# Patient Record
Sex: MALE | Race: OTHER | HISPANIC OR LATINO
[De-identification: names, ages, dates, MRNs, and addresses within clinical notes are randomized per-mention and may not be internally consistent; named-entity substitution may affect disease eponyms.]

---

## 2017-03-23 ENCOUNTER — LABORATORY RESULT (OUTPATIENT)
Age: 32
End: 2017-03-23

## 2017-03-23 ENCOUNTER — APPOINTMENT (OUTPATIENT)
Dept: INTERNAL MEDICINE | Facility: CLINIC | Age: 32
End: 2017-03-23

## 2017-03-23 VITALS
DIASTOLIC BLOOD PRESSURE: 84 MMHG | BODY MASS INDEX: 37.33 KG/M2 | TEMPERATURE: 98.6 F | HEART RATE: 102 BPM | OXYGEN SATURATION: 97 % | WEIGHT: 252 LBS | HEIGHT: 69 IN | SYSTOLIC BLOOD PRESSURE: 114 MMHG

## 2017-03-23 DIAGNOSIS — E80.4 GILBERT SYNDROME: ICD-10-CM

## 2017-03-23 DIAGNOSIS — R89.9 UNSPECIFIED ABNORMAL FINDING IN SPECIMENS FROM OTHER ORGANS, SYSTEMS AND TISSUES: ICD-10-CM

## 2017-03-23 RX ORDER — TRAZODONE HYDROCHLORIDE 100 MG/1
100 TABLET ORAL
Qty: 60 | Refills: 0 | Status: ACTIVE | COMMUNITY
Start: 2017-02-08

## 2017-03-23 RX ORDER — DIVALPROEX SODIUM 500 MG/1
500 TABLET, DELAYED RELEASE ORAL
Qty: 30 | Refills: 0 | Status: DISCONTINUED | COMMUNITY
Start: 2016-10-17 | End: 2017-03-23

## 2017-03-23 RX ORDER — ARIPIPRAZOLE 15 MG/1
15 TABLET ORAL
Qty: 15 | Refills: 0 | Status: DISCONTINUED | COMMUNITY
Start: 2016-09-28 | End: 2017-03-23

## 2017-03-28 PROBLEM — R89.9 ABNORMAL LABORATORY TEST: Status: ACTIVE | Noted: 2017-03-28

## 2017-03-28 LAB
25(OH)D3 SERPL-MCNC: 12.2 NG/ML
ALBUMIN SERPL ELPH-MCNC: 4.5 G/DL
ALP BLD-CCNC: 55 U/L
ALT SERPL-CCNC: 28 U/L
ANION GAP SERPL CALC-SCNC: 12 MMOL/L
APPEARANCE: ABNORMAL
AST SERPL-CCNC: 28 U/L
BASOPHILS # BLD AUTO: 0.03 K/UL
BASOPHILS NFR BLD AUTO: 0.8 %
BILIRUB SERPL-MCNC: 1.6 MG/DL
BILIRUBIN URINE: NEGATIVE
BLOOD URINE: ABNORMAL
BUN SERPL-MCNC: 13 MG/DL
CALCIUM SERPL-MCNC: 9.6 MG/DL
CHLORIDE SERPL-SCNC: 100 MMOL/L
CHOLEST SERPL-MCNC: 162 MG/DL
CHOLEST/HDLC SERPL: 3.1 RATIO
CO2 SERPL-SCNC: 28 MMOL/L
COLOR: YELLOW
CREAT SERPL-MCNC: 1.18 MG/DL
EOSINOPHIL # BLD AUTO: 0.11 K/UL
EOSINOPHIL NFR BLD AUTO: 2.8 %
GGT SERPL-CCNC: 23 U/L
GLUCOSE QUALITATIVE U: NORMAL MG/DL
GLUCOSE SERPL-MCNC: 92 MG/DL
HBA1C MFR BLD HPLC: 4.8 %
HCT VFR BLD CALC: 43.9 %
HDLC SERPL-MCNC: 52 MG/DL
HGB BLD-MCNC: 14.5 G/DL
IMM GRANULOCYTES NFR BLD AUTO: 0 %
KETONES URINE: NEGATIVE
LDLC SERPL CALC-MCNC: 94 MG/DL
LEUKOCYTE ESTERASE URINE: NEGATIVE
LYMPHOCYTES # BLD AUTO: 1.7 K/UL
LYMPHOCYTES NFR BLD AUTO: 43.8 %
MAN DIFF?: NORMAL
MCHC RBC-ENTMCNC: 29.1 PG
MCHC RBC-ENTMCNC: 33 GM/DL
MCV RBC AUTO: 88.2 FL
MONOCYTES # BLD AUTO: 0.57 K/UL
MONOCYTES NFR BLD AUTO: 14.7 %
NEUTROPHILS # BLD AUTO: 1.47 K/UL
NEUTROPHILS NFR BLD AUTO: 37.9 %
NITRITE URINE: NEGATIVE
PH URINE: 6
PLATELET # BLD AUTO: 284 K/UL
POTASSIUM SERPL-SCNC: 4.7 MMOL/L
PROLACTIN SERPL-MCNC: 8 NG/ML
PROT SERPL-MCNC: 7.1 G/DL
PROTEIN URINE: NEGATIVE MG/DL
RBC # BLD: 4.98 M/UL
RBC # FLD: 11.8 %
SODIUM SERPL-SCNC: 140 MMOL/L
SPECIFIC GRAVITY URINE: 1.03
TRIGL SERPL-MCNC: 81 MG/DL
TSH SERPL-ACNC: 0.93 UIU/ML
UROBILINOGEN URINE: NORMAL MG/DL
WBC # FLD AUTO: 3.88 K/UL

## 2017-03-30 ENCOUNTER — LABORATORY RESULT (OUTPATIENT)
Age: 32
End: 2017-03-30

## 2017-04-05 LAB
APPEARANCE: CLEAR
BASOPHILS # BLD AUTO: 0.03 K/UL
BASOPHILS NFR BLD AUTO: 0.5 %
BILIRUBIN URINE: NEGATIVE
BLOOD URINE: ABNORMAL
COLOR: YELLOW
EOSINOPHIL # BLD AUTO: 0.1 K/UL
EOSINOPHIL NFR BLD AUTO: 1.6 %
GLUCOSE QUALITATIVE U: NORMAL MG/DL
HCT VFR BLD CALC: 45.6 %
HGB BLD-MCNC: 15.1 G/DL
IMM GRANULOCYTES NFR BLD AUTO: 0.3 %
KETONES URINE: NEGATIVE
LEUKOCYTE ESTERASE URINE: NEGATIVE
LYMPHOCYTES # BLD AUTO: 2.81 K/UL
LYMPHOCYTES NFR BLD AUTO: 45.5 %
MAN DIFF?: NORMAL
MCHC RBC-ENTMCNC: 29.2 PG
MCHC RBC-ENTMCNC: 33.1 GM/DL
MCV RBC AUTO: 88.2 FL
MONOCYTES # BLD AUTO: 0.73 K/UL
MONOCYTES NFR BLD AUTO: 11.8 %
NEUTROPHILS # BLD AUTO: 2.49 K/UL
NEUTROPHILS NFR BLD AUTO: 40.3 %
NITRITE URINE: NEGATIVE
PH URINE: 6
PLATELET # BLD AUTO: 316 K/UL
PROTEIN URINE: NEGATIVE MG/DL
RBC # BLD: 5.17 M/UL
RBC # FLD: 11.8 %
SPECIFIC GRAVITY URINE: 1.02
UROBILINOGEN URINE: NORMAL MG/DL
WBC # FLD AUTO: 6.18 K/UL

## 2017-05-04 ENCOUNTER — APPOINTMENT (OUTPATIENT)
Dept: UROLOGY | Facility: CLINIC | Age: 32
End: 2017-05-04

## 2017-05-04 VITALS
TEMPERATURE: 98.3 F | RESPIRATION RATE: 20 BRPM | DIASTOLIC BLOOD PRESSURE: 78 MMHG | BODY MASS INDEX: 37.62 KG/M2 | WEIGHT: 254 LBS | HEART RATE: 94 BPM | HEIGHT: 69 IN | SYSTOLIC BLOOD PRESSURE: 112 MMHG

## 2017-05-05 LAB
APPEARANCE: CLEAR
BACTERIA: NEGATIVE
BILIRUBIN URINE: NEGATIVE
BLOOD URINE: NEGATIVE
COLOR: YELLOW
GLUCOSE QUALITATIVE U: NORMAL MG/DL
HYALINE CASTS: 0 /LPF
KETONES URINE: NEGATIVE
LEUKOCYTE ESTERASE URINE: NEGATIVE
MICROSCOPIC-UA: NORMAL
NITRITE URINE: NEGATIVE
PH URINE: 5.5
PROTEIN URINE: NEGATIVE MG/DL
RED BLOOD CELLS URINE: 2 /HPF
SPECIFIC GRAVITY URINE: 1.02
SQUAMOUS EPITHELIAL CELLS: 0 /HPF
UROBILINOGEN URINE: NORMAL MG/DL
WHITE BLOOD CELLS URINE: 1 /HPF

## 2017-05-08 LAB
BACTERIA UR CULT: NORMAL
C TRACH RRNA SPEC QL NAA+PROBE: NORMAL
N GONORRHOEA RRNA SPEC QL NAA+PROBE: NORMAL
SOURCE AMPLIFICATION: NORMAL

## 2017-06-01 ENCOUNTER — APPOINTMENT (OUTPATIENT)
Dept: UROLOGY | Facility: CLINIC | Age: 32
End: 2017-06-01

## 2017-06-01 VITALS
HEART RATE: 96 BPM | TEMPERATURE: 98.5 F | HEIGHT: 69 IN | BODY MASS INDEX: 36.58 KG/M2 | WEIGHT: 247 LBS | SYSTOLIC BLOOD PRESSURE: 120 MMHG | OXYGEN SATURATION: 97 % | DIASTOLIC BLOOD PRESSURE: 70 MMHG

## 2017-06-01 DIAGNOSIS — R31.29 OTHER MICROSCOPIC HEMATURIA: ICD-10-CM

## 2017-06-01 DIAGNOSIS — N47.1 PHIMOSIS: ICD-10-CM

## 2017-07-06 ENCOUNTER — APPOINTMENT (OUTPATIENT)
Dept: UROLOGY | Facility: CLINIC | Age: 32
End: 2017-07-06

## 2017-07-10 ENCOUNTER — OUTPATIENT (OUTPATIENT)
Dept: OUTPATIENT SERVICES | Facility: HOSPITAL | Age: 32
LOS: 1 days | End: 2017-07-10
Payer: MEDICARE

## 2017-07-10 ENCOUNTER — APPOINTMENT (OUTPATIENT)
Dept: ULTRASOUND IMAGING | Facility: HOSPITAL | Age: 32
End: 2017-07-10

## 2017-07-10 DIAGNOSIS — R31.29 OTHER MICROSCOPIC HEMATURIA: ICD-10-CM

## 2017-07-10 PROCEDURE — 76775 US EXAM ABDO BACK WALL LIM: CPT

## 2017-07-10 PROCEDURE — 76857 US EXAM PELVIC LIMITED: CPT

## 2017-07-10 PROCEDURE — 76770 US EXAM ABDO BACK WALL COMP: CPT

## 2017-07-22 ENCOUNTER — EMERGENCY (EMERGENCY)
Facility: HOSPITAL | Age: 32
LOS: 1 days | Discharge: ANOTHER HOSPITAL | End: 2017-07-22
Attending: EMERGENCY MEDICINE | Admitting: EMERGENCY MEDICINE
Payer: MEDICARE

## 2017-07-22 VITALS
OXYGEN SATURATION: 97 % | RESPIRATION RATE: 17 BRPM | TEMPERATURE: 98 F | HEART RATE: 102 BPM | SYSTOLIC BLOOD PRESSURE: 122 MMHG | DIASTOLIC BLOOD PRESSURE: 71 MMHG

## 2017-07-22 PROCEDURE — 99284 EMERGENCY DEPT VISIT MOD MDM: CPT | Mod: 25,GC

## 2017-07-22 PROCEDURE — 99053 MED SERV 10PM-8AM 24 HR FAC: CPT

## 2017-07-22 NOTE — ED ADULT TRIAGE NOTE - CHIEF COMPLAINT QUOTE
pt c/o of insomnia, anxiety, racing thoughts and problems focusing.   NP called, pt to go directly back.

## 2017-07-23 ENCOUNTER — INPATIENT (INPATIENT)
Facility: HOSPITAL | Age: 32
LOS: 11 days | Discharge: HOME | End: 2017-08-04
Attending: PSYCHIATRY & NEUROLOGY | Admitting: PSYCHIATRY & NEUROLOGY

## 2017-07-23 VITALS
TEMPERATURE: 98 F | HEART RATE: 75 BPM | DIASTOLIC BLOOD PRESSURE: 75 MMHG | OXYGEN SATURATION: 100 % | RESPIRATION RATE: 16 BRPM | SYSTOLIC BLOOD PRESSURE: 103 MMHG

## 2017-07-23 VITALS — DIASTOLIC BLOOD PRESSURE: 86 MMHG | TEMPERATURE: 98 F | HEART RATE: 85 BPM | SYSTOLIC BLOOD PRESSURE: 135 MMHG

## 2017-07-23 DIAGNOSIS — F31.9 BIPOLAR DISORDER, UNSPECIFIED: ICD-10-CM

## 2017-07-23 DIAGNOSIS — I10 ESSENTIAL (PRIMARY) HYPERTENSION: ICD-10-CM

## 2017-07-23 DIAGNOSIS — E03.9 HYPOTHYROIDISM, UNSPECIFIED: ICD-10-CM

## 2017-07-23 DIAGNOSIS — F31.11 BIPOLAR DISORDER, CURRENT EPISODE MANIC WITHOUT PSYCHOTIC FEATURES, MILD: ICD-10-CM

## 2017-07-23 DIAGNOSIS — Z29.9 ENCOUNTER FOR PROPHYLACTIC MEASURES, UNSPECIFIED: ICD-10-CM

## 2017-07-23 LAB
ALBUMIN SERPL ELPH-MCNC: 3.9 G/DL — SIGNIFICANT CHANGE UP (ref 3.3–5)
ALP SERPL-CCNC: 41 U/L — SIGNIFICANT CHANGE UP (ref 40–120)
ALT FLD-CCNC: 21 U/L — SIGNIFICANT CHANGE UP (ref 4–41)
APAP SERPL-MCNC: < 15 UG/ML — LOW (ref 15–25)
AST SERPL-CCNC: 30 U/L — SIGNIFICANT CHANGE UP (ref 4–40)
BARBITURATES MEASUREMENT: NEGATIVE — SIGNIFICANT CHANGE UP
BASOPHILS # BLD AUTO: 0.03 K/UL — SIGNIFICANT CHANGE UP (ref 0–0.2)
BASOPHILS NFR BLD AUTO: 0.4 % — SIGNIFICANT CHANGE UP (ref 0–2)
BENZODIAZ SERPL-MCNC: NEGATIVE — SIGNIFICANT CHANGE UP
BILIRUB SERPL-MCNC: 0.9 MG/DL — SIGNIFICANT CHANGE UP (ref 0.2–1.2)
BUN SERPL-MCNC: 20 MG/DL — SIGNIFICANT CHANGE UP (ref 7–23)
CALCIUM SERPL-MCNC: 8.9 MG/DL — SIGNIFICANT CHANGE UP (ref 8.4–10.5)
CHLORIDE SERPL-SCNC: 104 MMOL/L — SIGNIFICANT CHANGE UP (ref 98–107)
CO2 SERPL-SCNC: 24 MMOL/L — SIGNIFICANT CHANGE UP (ref 22–31)
CREAT SERPL-MCNC: 1.44 MG/DL — HIGH (ref 0.5–1.3)
EOSINOPHIL # BLD AUTO: 0.09 K/UL — SIGNIFICANT CHANGE UP (ref 0–0.5)
EOSINOPHIL NFR BLD AUTO: 1.3 % — SIGNIFICANT CHANGE UP (ref 0–6)
ETHANOL BLD-MCNC: < 10 MG/DL — SIGNIFICANT CHANGE UP
GLUCOSE SERPL-MCNC: 139 MG/DL — HIGH (ref 70–99)
HCT VFR BLD CALC: 37.3 % — LOW (ref 39–50)
HGB BLD-MCNC: 12.4 G/DL — LOW (ref 13–17)
IMM GRANULOCYTES # BLD AUTO: 0.04 # — SIGNIFICANT CHANGE UP
IMM GRANULOCYTES NFR BLD AUTO: 0.6 % — SIGNIFICANT CHANGE UP (ref 0–1.5)
LYMPHOCYTES # BLD AUTO: 3.28 K/UL — SIGNIFICANT CHANGE UP (ref 1–3.3)
LYMPHOCYTES # BLD AUTO: 48.5 % — HIGH (ref 13–44)
MCHC RBC-ENTMCNC: 30.2 PG — SIGNIFICANT CHANGE UP (ref 27–34)
MCHC RBC-ENTMCNC: 33.2 % — SIGNIFICANT CHANGE UP (ref 32–36)
MCV RBC AUTO: 90.8 FL — SIGNIFICANT CHANGE UP (ref 80–100)
MONOCYTES # BLD AUTO: 0.88 K/UL — SIGNIFICANT CHANGE UP (ref 0–0.9)
MONOCYTES NFR BLD AUTO: 13 % — SIGNIFICANT CHANGE UP (ref 2–14)
NEUTROPHILS # BLD AUTO: 2.44 K/UL — SIGNIFICANT CHANGE UP (ref 1.8–7.4)
NEUTROPHILS NFR BLD AUTO: 36.2 % — LOW (ref 43–77)
NRBC # FLD: 0 — SIGNIFICANT CHANGE UP
PLATELET # BLD AUTO: 198 K/UL — SIGNIFICANT CHANGE UP (ref 150–400)
PMV BLD: 10 FL — SIGNIFICANT CHANGE UP (ref 7–13)
POTASSIUM SERPL-MCNC: 3.8 MMOL/L — SIGNIFICANT CHANGE UP (ref 3.5–5.3)
POTASSIUM SERPL-SCNC: 3.8 MMOL/L — SIGNIFICANT CHANGE UP (ref 3.5–5.3)
PROT SERPL-MCNC: 6.2 G/DL — SIGNIFICANT CHANGE UP (ref 6–8.3)
RBC # BLD: 4.11 M/UL — LOW (ref 4.2–5.8)
RBC # FLD: 11.6 % — SIGNIFICANT CHANGE UP (ref 10.3–14.5)
SALICYLATES SERPL-MCNC: < 5 MG/DL — LOW (ref 15–30)
SODIUM SERPL-SCNC: 142 MMOL/L — SIGNIFICANT CHANGE UP (ref 135–145)
TSH SERPL-MCNC: 1.66 UIU/ML — SIGNIFICANT CHANGE UP (ref 0.27–4.2)
VALPROATE SERPL-MCNC: 24.8 UG/ML — LOW (ref 50–100)
WBC # BLD: 6.76 K/UL — SIGNIFICANT CHANGE UP (ref 3.8–10.5)
WBC # FLD AUTO: 6.76 K/UL — SIGNIFICANT CHANGE UP (ref 3.8–10.5)

## 2017-07-23 PROCEDURE — 99285 EMERGENCY DEPT VISIT HI MDM: CPT | Mod: GC

## 2017-07-23 RX ORDER — HALOPERIDOL DECANOATE 100 MG/ML
5 INJECTION INTRAMUSCULAR AT BEDTIME
Qty: 0 | Refills: 0 | Status: DISCONTINUED | OUTPATIENT
Start: 2017-07-23 | End: 2017-07-26

## 2017-07-23 RX ORDER — HALOPERIDOL DECANOATE 100 MG/ML
2 INJECTION INTRAMUSCULAR
Qty: 0 | Refills: 0 | Status: DISCONTINUED | OUTPATIENT
Start: 2017-07-23 | End: 2017-07-26

## 2017-07-23 RX ORDER — DIVALPROEX SODIUM 500 MG/1
500 TABLET, DELAYED RELEASE ORAL EVERY 12 HOURS
Qty: 0 | Refills: 0 | Status: DISCONTINUED | OUTPATIENT
Start: 2017-07-23 | End: 2017-07-30

## 2017-07-23 RX ORDER — DIPHENHYDRAMINE HCL 50 MG
50 CAPSULE ORAL ONCE
Qty: 0 | Refills: 0 | Status: COMPLETED | OUTPATIENT
Start: 2017-07-23 | End: 2017-07-23

## 2017-07-23 RX ORDER — DIVALPROEX SODIUM 500 MG/1
500 TABLET, DELAYED RELEASE ORAL
Qty: 0 | Refills: 0 | Status: DISCONTINUED | OUTPATIENT
Start: 2017-07-23 | End: 2017-07-23

## 2017-07-23 RX ORDER — DIVALPROEX SODIUM 500 MG/1
500 TABLET, DELAYED RELEASE ORAL AT BEDTIME
Qty: 0 | Refills: 0 | Status: DISCONTINUED | OUTPATIENT
Start: 2017-07-23 | End: 2017-07-23

## 2017-07-23 RX ORDER — DIPHENHYDRAMINE HCL 50 MG
50 CAPSULE ORAL EVERY 6 HOURS
Qty: 0 | Refills: 0 | Status: DISCONTINUED | OUTPATIENT
Start: 2017-07-23 | End: 2017-07-26

## 2017-07-23 RX ORDER — HALOPERIDOL DECANOATE 100 MG/ML
5 INJECTION INTRAMUSCULAR EVERY 6 HOURS
Qty: 0 | Refills: 0 | Status: DISCONTINUED | OUTPATIENT
Start: 2017-07-23 | End: 2017-07-26

## 2017-07-23 RX ORDER — DIVALPROEX SODIUM 500 MG/1
500 TABLET, DELAYED RELEASE ORAL ONCE
Qty: 0 | Refills: 0 | Status: COMPLETED | OUTPATIENT
Start: 2017-07-23 | End: 2017-07-23

## 2017-07-23 RX ORDER — HALOPERIDOL DECANOATE 100 MG/ML
5 INJECTION INTRAMUSCULAR ONCE
Qty: 0 | Refills: 0 | Status: DISCONTINUED | OUTPATIENT
Start: 2017-07-23 | End: 2017-08-04

## 2017-07-23 RX ORDER — DIPHENHYDRAMINE HCL 50 MG
50 CAPSULE ORAL ONCE
Qty: 0 | Refills: 0 | Status: DISCONTINUED | OUTPATIENT
Start: 2017-07-23 | End: 2017-08-04

## 2017-07-23 RX ORDER — DIVALPROEX SODIUM 500 MG/1
500 TABLET, DELAYED RELEASE ORAL
Qty: 0 | Refills: 0 | Status: DISCONTINUED | OUTPATIENT
Start: 2017-07-23 | End: 2017-07-26

## 2017-07-23 RX ORDER — HALOPERIDOL DECANOATE 100 MG/ML
5 INJECTION INTRAMUSCULAR ONCE
Qty: 0 | Refills: 0 | Status: COMPLETED | OUTPATIENT
Start: 2017-07-23 | End: 2017-07-23

## 2017-07-23 RX ADMIN — DIVALPROEX SODIUM 500 MILLIGRAM(S): 500 TABLET, DELAYED RELEASE ORAL at 08:09

## 2017-07-23 RX ADMIN — HALOPERIDOL DECANOATE 5 MILLIGRAM(S): 100 INJECTION INTRAMUSCULAR at 20:44

## 2017-07-23 RX ADMIN — Medication 2 MILLIGRAM(S): at 00:34

## 2017-07-23 RX ADMIN — DIVALPROEX SODIUM 500 MILLIGRAM(S): 500 TABLET, DELAYED RELEASE ORAL at 20:44

## 2017-07-23 RX ADMIN — Medication 50 MILLIGRAM(S): at 00:34

## 2017-07-23 RX ADMIN — HALOPERIDOL DECANOATE 5 MILLIGRAM(S): 100 INJECTION INTRAMUSCULAR at 00:34

## 2017-07-23 RX ADMIN — Medication 2 MILLIGRAM(S): at 08:09

## 2017-07-23 NOTE — H&P ADULT - ATTENDING COMMENTS
32 y/o M PMHx significant for HTN, Hypothyroidism, Obesity, multiple hospitalizations for Bipolar disorder, and hx of cannabis abuse who was admitted to  for further evaluation acute seferino.    1)Bipolar affective disorder, current episode manic, current episode severity unspecified.    ~cont. management per Psychiatry at this time    2)Essential hypertension  ~the patient takes Amlodipine at home but does not remember actual dose  ~cont. low salt diet for now  ~SBP in the 130s  ~will treat accordingly    3)Hypothyroidism  ~the patient has remained non-compliant with his prescribed home meds  ~f/u TFTs    4)Vte ppx  ~encourage ambulation

## 2017-07-23 NOTE — ED BEHAVIORAL HEALTH ASSESSMENT NOTE - OTHER PAST PSYCHIATRIC HISTORY (INCLUDE DETAILS REGARDING ONSET, COURSE OF ILLNESS, INPATIENT/OUTPATIENT TREATMENT)
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 Became psychotic (a/v hallucinations, paranoia) and manic. He was d/c from the [facility] and hospitalized in [place]. He was ha hx of multiple inpatient hospitalizations- in [place], [facility], [place] and [facility] from 2007-current. Discharged 9/30/16 from [place] after 1 week. Did not follow up at [facility] on 10/5/16 He has at least 2 past SA last in 2008. Patient stated he OD on medication both times. He a hx of medication non compliance     He was last at [facility] 1 week ago for Schizo-Aff In addition to recent admission in [place], previously was admitted to [facility] 4 10/10/16- 1017/16.  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 Became psychotic (a/v hallucinations, paranoia) and manic. He was d/c from the [facility] and hospitalized in [place]. He was ha hx of multiple inpatient hospitalizations- in [place], [facility], [place] and [facility] from 2007-current. Discharged 9/30/16 from [place] after 1 week. [facility] He has at least 2 past SA last in 2008. Patient stated he OD on medication both times. He a hx of medication non compliance     He was last at [facility] 1 week ago for Schizo-Aff In addition to recent admission in [place], previously was admitted to [facility] 4 10/10/16- 1017/16.  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 Became psychotic (a/v hallucinations, paranoia) and manic. He was d/c from [facility] and hospitalized in [place]. He was ha hx of multiple inpatient hospitalizations- in [place], [facility], [place] and [facility] from 2007-current. Discharged 9/30/16 from [place] after 1 week. [facility] He has at least 2 past SA last in 2008. Patient stated he OD on medication both times. He a hx of medication non compliance

## 2017-07-23 NOTE — ED BEHAVIORAL HEALTH ASSESSMENT NOTE - SUICIDE RISK FACTORS
Agitation/severe anxiety/Mood episode/Substance abuse/dependence/Unable to engage in safety planning/Global insomnia/Highly impulsive behavior

## 2017-07-23 NOTE — ED BEHAVIORAL HEALTH ASSESSMENT NOTE - PSYCHIATRIC ISSUES AND PLAN (INCLUDE STANDING AND PRN MEDICATION)
will restart Abilify at 10mg q. daily, Depakote 500mg bid, Trazodone 100mg qhs. PRNS: Haldol 5/Ativan 2/Benadryl 50 PO/IM will increase Depakote ER  to 500mg bid, increase Trazodone [id number] qhs. Inpatient team [facility]. PRNS: Haldol 5/Ativan 2/Benadryl 50 PO/IM

## 2017-07-23 NOTE — H&P ADULT - FAMILY HISTORY
No pertinent family history in first degree relatives Mother  Still living? Unknown  Family history of hypertension, Age at diagnosis: Age Unknown

## 2017-07-23 NOTE — ED BEHAVIORAL HEALTH ASSESSMENT NOTE - AXIS IV
Economic problems/Problems with access to healthcare services/Problem related to social environment/Occupational problems

## 2017-07-23 NOTE — ED BEHAVIORAL HEALTH ASSESSMENT NOTE - CASE SUMMARY
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 PPH includes Bipolar 1 Disorder & Cannabis abuse, past dx of bipolar disorder vs schizoaffective disorder. He has a hx of non medication compliance. He has a hx of multiple inpatient hospitalizations- recently discharged from [facility] on 7/20/17 where he was admitted with a possible manic episode in context of medication non compliance He has a hx of multiple SA last 5-6 years ago in . He has a hx of aggression/violence when manic and a hx of arrest two years ago for disorderly conduct and 3 years ago for possession of MJ. Medication compliance is questionable, and depakote level is currently subtherapeutic. Furthermore, patient has been impulsive and agitated in ER, with poor behavioral control, and even when calmed, is unable to demonstrate safety planning and it is doubtful that he will reliably follow up with treatment that was arranged by [facility].(is currently refusing [facility] as he takes issue with the program hours). Dx in keeping with bipolar disorder. Given acute risks of recent discharge from hospital, current affective instability, global insomnia, psychomotor agitation along with chronic risks such as medication non compliance, hx of violence especially related to insomnia and seferino, hx of suicide attempts, hx of substance abuse, and multiple psych admissions, he currently poses a danger to self and others and requires inpatient hospitalization for treatment and stabilization. No need for 1;1 as denies active SI/HI. 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 PPH includes Bipolar 1 Disorder & Cannabis abuse, past dx of bipolar disorder vs schizoaffective disorder. He has a hx of non medication compliance. He has a hx of multiple inpatient hospitalizations- recently discharged from [facility] on 7/20/17 where he was admitted with a possible manic episode in context of medication non compliance He has a hx of multiple SA last 5-6 years ago in . He has a hx of aggression/violence when manic and a hx of arrest two years ago for disorderly conduct and 3 years ago for possession of MJ. Medication compliance is questionable, and depakote level is currently subtherapeutic; patient has been impulsive and agitated in ER, with poor behavioral control, Loud, cursing, pacing, raping. and even when calmed, He is currently refusing to go back to [facility] and most likely he will not f/u even if we refer him to another clinic or crisis center  Given acute risks of recent discharge from hospital, current affective instability, global insomnia, psychomotor agitation along with chronic risks such as medication non compliance, hx of violence especially related to insomnia and seferino, hx of suicide attempts, hx of substance abuse, and multiple psych admissions, he currently poses a danger to self and others and requires inpatient hospitalization for treatment and stabilization.

## 2017-07-23 NOTE — PROGRESS NOTE BEHAVIORAL HEALTH - NSBHFUPINTERVALCCFT_PSY_A_CORE
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 PPH includes Bipolar 1 Disorder & Cannabis abuse, past dx of bipolar disorder vs schizoaffective disorder. He has a hx of non medication compliance. He has a hx of multiple inpatient hospitalizations- recently discharged from [facility] on 7/20/17 where he was admitted with a possible manic episode in context of medication non compliance He has a hx of multiple SA last 5-6 years ago in . He has a hx of aggression/violence when manic and a hx of arrest two years ago for disorderly conduct and 3 years ago for possession of MJ. PMH includes obesity, hypothyroidism & HTN. BIB self with insomnia and complaining of "racing thoughts".     On arrival to ER, patient was initially cooperative, he ate a sandwich then rather suddenly became acutely agitated, throwing chairs, spitting and punching walls, he required IM haldol 5mg/Benadryl 50mg/ativan 2mg and 4 point restraints, after which patient slept for several hours. On interview, pt is fairly cooperative but intrusive, over expansive mood. He states that he has been unable to sleep since discharge from [facility], and he reports increased energy and goal directed activity. He states that he "voluntarily came in for help" because he has not slept for past 3 days and is afraid that he will become aggressive in the home, which is a consequence of not sleeping. He states that this is the first time that he has willingly sought help, stating "they usually have to call the  on me". He states that he was admitted to [facility] on 7/14 in the context of aggression, making threats to harm others and not sleeping, and had been non compliant with medications, and was discharged on depakote 500mg hs, trazodone 50mg hs, and haldol 10mg, He states that he was also given "a long term Abilify injection" while in [facility], he is not sure of date. He states that he has an appointment to go to [facility], however he states that he is refusing to go to [facility] hospital as he perceives this as "too many hours", he has no current outpatient provider having been discharged from [facility] on admission to [facility].   He denies SI/I/P or active HI.  On reflecting on the most recent episode of agitation in  ED, he shows no remorse stating "that girl was disrespectful to me when I asked for another sandwich... that's the way I am since I was a kid". On psychiatric review of sx , he endorses irritability, insomnia, increased goal directed activity, and in the ER is observed to be hyperverbal, grandiose, intrusive and psychomotor agitated and pacing. He denies delusions or AVH. He is unable to engage in safe discharge planning. He      Patient reports he has been complaint with prescribed out pt meds of Depakote, Trazadone and holdol since discharge, however serum valproic acid level was subtherapeutic at 24.8.    Collateral history from patient's mother Lachelle Guadalupe: Mother states that she thinks patient was discharged to early from [facility], he hasn't been sleeping for past 3 days, and has been agitated, calling people on the phone at all hours of the morning, talking constantly and loudly, and being verbally aggressive. She states that patient is not currently at baseline, and she is afraid of him. She states that this evening, he told mother that he was going to hospital to get admitted. She is concerned that he is not complaint with medication and is behaving in a disorganized fashion at home, she was upset to hear that he is refusing to go to [facility] and states that she will be unable to convince him to go to [facility] or take medication. Mother does not think he is safe to come home as he is not sleeping and is verbally aggressive.

## 2017-07-23 NOTE — H&P ADULT - HISTORY OF PRESENT ILLNESS
32 yo M with PMH significant for HTN, Hypothyroidism?, Obesity, Bipolar disorder, Hx of Cannabis abuse admitted to 5N from [facility] ED for eval. of insomnia and racing thoughts. Pt was recently discharged from [facility] on 07/20 , admitted for manic episode. Pt states that he went home, never slept and started to became aggressive so his mom called to police and sent him to the [facility] ED and from there transferred to  for inpatient psych admission. Pt became severe  aggressive in the ED yesterday, required sedation and restrain. Pt has Hx of multiple SA in the past. Medicine consult is called  for medical Mx.    Pt states that he feels fine s/p IV ativan yesterday. Pt states that he has Hx fo HTN and takes Amlodipine low dose but does not remember actual dose. Pt states that he has  Hx of hypothyroidism but did not take any meds for the past 2-3 yrs. Denies headache dizziness,  chest pain, palpitations, SOB ar any acute c /o at present. 30 yo M with PMH significant for HTN, Hypothyroidism?, Obesity, Bipolar disorder, Hx of Cannabis abuse admitted to 5N from [facility] ED for eval. of insomnia and racing thoughts. Pt was recently discharged from [facility] on 07/20 , admitted for manic episode. Pt states that he went home, never slept and started to became aggressive so his mom called to police and sent him to the [facility] ED and from there transferred to  for inpatient psych admission. Pt became severe  aggressive in the ED yesterday, required sedation and restrain. Pt has Hx of multiple SA in the past and medication non compliance. Medicine consult is called  for medical Mx.    Pt states that he feels fine s/p IV ativan yesterday. Pt states that he has Hx fo HTN and takes Amlodipine low dose but does not remember actual dose. Pt states that he has  Hx of hypothyroidism but did not take any meds for the past 2-3 yrs. Denies headache dizziness,  chest pain, palpitations, SOB ar any acute c /o at present. 32 yo M with PMH significant for HTN, Hypothyroidism?, Obesity, Bipolar disorder, Hx of Cannabis abuse admitted to 5N from [facility] ED for eval. of insomnia and racing thoughts. Pt was recently discharged from [facility] on 07/20, admitted for manic episode. Pt states that he went home, never slept and started to became aggressive so his mom called to police and sent him to the [facility] ED and from there transferred to  for inpatient psych admission. Pt became severe  aggressive in the ED yesterday, required sedation and restrain. Pt has Hx of multiple SA in the past and medication non compliance. Medicine consult is called  for medical Mx.    Pt states that he feels fine s/p IV ativan yesterday. Pt states that he has Hx fo HTN and takes Amlodipine low dose but does not remember actual dose. Pt states that he has  Hx of hypothyroidism but did not take any meds for the past 2-3 yrs. Denies headache dizziness,  chest pain, palpitations, SOB ar any acute c /o at present.

## 2017-07-23 NOTE — ED BEHAVIORAL HEALTH ASSESSMENT NOTE - DESCRIPTION
during course of hospitalization patient became agitated and was given Haldol 5/Benadrul 50/Ativan 2 IM with restraints HTN, Obesity, Hypothyroidism Born in  came [place] in [place], parents split it up at this time. Father remarried twice, mother remarried once. 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

## 2017-07-23 NOTE — ED BEHAVIORAL HEALTH ASSESSMENT NOTE - DESCRIPTION (FIRST USE, LAST USE, QUANTITY, FREQUENCY, DURATION)
hx of daily use, reports use over past week. bought 2 xanax sticks off the street hx of daily use, denies use over past week.

## 2017-07-23 NOTE — ED ADULT NURSE NOTE - OBJECTIVE STATEMENT
BIB self for c/o insomnia and racing thoughts. Pt denies s/i h/i or a/v/h, reports he hasn't slept in "5 days". Pt is awake, a&ox3, with pressured sppech. Pt reports compliance with psychotropic meds since d/c from Health system. Denies drug/etoh use.

## 2017-07-23 NOTE — ED BEHAVIORAL HEALTH ASSESSMENT NOTE - SUMMARY
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 d/c one week ago, distant hx of multiple SA last 5-6 years ago, hx of aggression/violence when manic and a hx of arrest two years ago for disorderly conduct and 3 years ago for possession of MJ, PMH of obesity, hypothyroidism & HTN BIBEMS activated by mother for  agitation and threatening behavior at home.    Patient's subtheraputic Depakote level and missed [facility] appt indicate medication nonadherence, and pt's subjective report of elevated mood, decreased sleep, racing thoughts, and grandiose delusions, as well as collateral reports of irritability are consistent with a preliminary diagnosis of acute exacerbation of bipolar 1, although given reported THC use, substance induced mood d/o can not currently be excluded. Given his history of severe violence when manic, current mood episode, non compliance, and recent threats of violence, he cannot presently care for himself and is a risk of harm to both self and others. 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 PPH includes Bipolar 1 Disorder & Cannabis abuse, past dx of bipolar disorder vs schizoaffective disorder. He has a hx of non medication compliance. He has a hx of multiple inpatient hospitalizations- recently discharged from [facility] on 7/20/17 where he was admitted with a possible manic episode in context of medication non compliance He has a hx of multiple SA last 5-6 years ago in . He has a hx of aggression/violence when manic and a hx of arrest two years ago for disorderly conduct and 3 years ago for possession of MJ. PMH includes obesity, hypothyroidism & HTN. BIB self with insomnia and complaining of "racing thoughts". Collateral history from mother corroborates hx of  insomnia, lability and verbal aggression. Medication compliance is questionable, and depakote level is currently subtherapeutic. Furthermore, patient has been impulsive and agitated in ER, with poor behavioral control, and even when calmed, is unable to demonstrate safety planning and it is doubtful that he will reliably follow up with treatment that was arranged by [facility].(is currently refusing [facility] as he takes issue with the program hours). Dx in keeping with bipolar disorder and probable co-morbid antisocial personality disorder. Given acute risks of recent discharge from hospital, current affective instability, global insomnia, psychomotor agitation along with chronic risks such as medication non compliance, hx of violence especially related to insomnia and seferino, hx of suicide attempts, hx of substance abuse, and multiple psych admissions, he currently poses a danger to self and others and requires inpatient hospitalization for treatment and stabilization. No need for 1;1 as denies active SI/HI.

## 2017-07-23 NOTE — H&P ADULT - PROBLEM SELECTOR PLAN 2
# Hypertension  - Has Hx of Hypertension  - Takes Amlodipine at home but does not remember actual dose  - # Hypertension  - Has Hx of Hypertension  - Takes Amlodipine at home but does not remember actual dose  - SBP: 130's  - Monitro BP  - Low salt diet  - Obtain collateral form PMD in AM

## 2017-07-23 NOTE — H&P ADULT - ASSESSMENT
30 yo M with PMH significant for HTN, Hypothyroidism?, Obesity, Bipolar disorder, Hx of Cannabis abuse admitted to 5N from [facility] ED for eval. of insomnia and racing thoughts.

## 2017-07-23 NOTE — ED PROVIDER NOTE - PROGRESS NOTE DETAILS
Patient asleep. Comfortable respirations. Out of restraints. SUKHDEEP. awake Pt was seen by Psych and will be admitted to an open psych bed at [place]. Accepting physician is Dr. Escoto/

## 2017-07-23 NOTE — ED ADULT NURSE REASSESSMENT NOTE - NS ED NURSE REASSESS COMMENT FT1
Pt became extremely agitated and threatenign towards PES staff. Pt was unable to be verbally deescalated and threw a garbage can, unit chairs and boxes of gloves. Pt then spit on nursing station window. Pt placed in 4pt restraints at 0010 and received STAT Ativan 2/Haldol 5/Benadryl 50mg IM. Camise ANM aware. Pending psych/medical eval. 1:1 obs maintained for safety.

## 2017-07-23 NOTE — ED BEHAVIORAL HEALTH ASSESSMENT NOTE - RISK ASSESSMENT
Patient has significant chronic risk factors including hx of chronic mental illness, hx of violence, hx of suicide attempts, and past hx of inpt hospitalizations.  Acute unmodifiable risk factors include recent d/c form a psychiatric hospitalization.  Acute modifiable risk factors include medication nonadherence, active mood episode, recent psychoactive substance use, and global insomnia.  Protective factors include supportive family, access to treatment, and positive previous response to treatment.  Patient represents an acute danger to self and others and would benefit from inpt hospitalization for safety and medication stabilization. Given acute risks of recent discharge from hospital, current affective instability, global insomnia, psychomotor agitation and poor behavioral control in ER, along with chronic risks such as medication non compliance, hx of violence especially related to insomnia and seferino, hx of suicide attempts, hx of substance abuse, and multiple psych admissions, he currently poses a danger to self and others and requires inpatient hospitalization for treatment and stabilization. No need for 1;1 as denies active SI/HI.

## 2017-07-23 NOTE — ED BEHAVIORAL HEALTH ASSESSMENT NOTE - DETAILS
[facility] 7/14/17-7/20/17 after hours, unavailable Per records - details unknown Galactorrhea, ?kidney problems from lithium sister had 1 past inpatient hospitalization in the past- reason unknown D/C [facility] at age 17 after he went manic Dr. Arreola Low 4 family informed [facility] 7/14/17-7/20/17 Randall White

## 2017-07-23 NOTE — ED ADULT NURSE REASSESSMENT NOTE - NS ED NURSE REASSESS COMMENT FT1
Received report from night RN pt calm & cooperative in nad denies Si/Hi/AVh at present eval on going.

## 2017-07-23 NOTE — ED PROVIDER NOTE - OBJECTIVE STATEMENT
Bipolar + schizoaffective. d/c from psych 2 days ago. PMH of schizophrenia with inpt psychiatric hospitalization (discharged Friday) sent to ED per triage note by grandmother for spitting at cars. Pt was agitated in triage and given sedation for safety. Limited HPI due to sedation. Pt states he came to ED because he has not been able to sleep well. States he has been taking his medication as prescribed but also states he feels he was given "too much medication at the other hospital". No headache, CP, SOB. Denies SI/HI/AVH. Denies EtOH, tobacco or illicit drug use. PMH of Bipolar d/o and schizoaffective with inpt psychiatric hospitalization (discharged Friday from [facility]) sent to ED per triage note by grandmother for spitting at cars. Pt was agitated in triage and given sedation for safety. Limited HPI due to sedation. Pt states he came to ED because he has not been able to sleep well. States he has been taking his medication as prescribed but also states he feels he was given "too much medication at the other hospital". No headache, CP, SOB. Denies SI/HI/AVH. Denies EtOH, tobacco or illicit drug use.

## 2017-07-23 NOTE — ED BEHAVIORAL HEALTH ASSESSMENT NOTE - CURRENT MEDICATION
Abilify SHEIKH and PO, Cogentin, Depakote, Trazadone (non compliant) Abilify SHEIKH and PO, Depakote, Trazadone, haldol (non compliant)

## 2017-07-23 NOTE — H&P ADULT - PROBLEM SELECTOR PLAN 3
# Hypothyroidism  - Does not take any meds for the past 2-3 yrs  - TSH check  - Obtain collateral from PMD in AM

## 2017-07-23 NOTE — H&P ADULT - NSHPSOCIALHISTORY_GEN_ALL_CORE
Denies smoking  Denies drinking alcohol  Denies using recreational drugs at present, Has Hx of Cannabis abuse in the past

## 2017-07-23 NOTE — PATIENT PROFILE BEHAVIORAL HEALTH - NSBHPSYTREAT_PSY_A_CORE
private therapist/partial hospitalization/pt has long hx bipolar d/o with about 40 previous hospitalizations.

## 2017-07-23 NOTE — PROGRESS NOTE BEHAVIORAL HEALTH - NSBHCHARTREVIEWVS_PSY_A_CORE FT
ICU Vital Signs Last 24 Hrs  T(C): --  T(F): --  HR: --  BP: --  BP(mean): --  ABP: --  ABP(mean): --  RR: --  SpO2: --  see NN
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
## 2017-07-23 NOTE — ED BEHAVIORAL HEALTH ASSESSMENT NOTE - DIFFERENTIAL
Bipolar 1 r/o schizoaffective d/o, r/o substance induced mood d/o Bipolar 1 r/o schizoaffective d/o, r/o substance induced mood d/o, r/o antisocial personality disorder

## 2017-07-24 DIAGNOSIS — F25.0 SCHIZOAFFECTIVE DISORDER, BIPOLAR TYPE: ICD-10-CM

## 2017-07-24 DIAGNOSIS — Z63.9 PROBLEM RELATED TO PRIMARY SUPPORT GROUP, UNSPECIFIED: ICD-10-CM

## 2017-07-24 DIAGNOSIS — Z91.19 PATIENT'S NONCOMPLIANCE WITH OTHER MEDICAL TREATMENT AND REGIMEN: ICD-10-CM

## 2017-07-24 LAB
ANION GAP SERPL CALC-SCNC: 7 MMOL/L — SIGNIFICANT CHANGE UP (ref 5–17)
BUN SERPL-MCNC: 13 MG/DL — SIGNIFICANT CHANGE UP (ref 7–23)
CALCIUM SERPL-MCNC: 9.2 MG/DL — SIGNIFICANT CHANGE UP (ref 8.5–10.1)
CHLORIDE SERPL-SCNC: 105 MMOL/L — SIGNIFICANT CHANGE UP (ref 96–108)
CHOLEST SERPL-MCNC: 126 MG/DL — SIGNIFICANT CHANGE UP (ref 10–199)
CO2 SERPL-SCNC: 28 MMOL/L — SIGNIFICANT CHANGE UP (ref 22–31)
CREAT SERPL-MCNC: 1.08 MG/DL — SIGNIFICANT CHANGE UP (ref 0.5–1.3)
GLUCOSE SERPL-MCNC: 89 MG/DL — SIGNIFICANT CHANGE UP (ref 70–99)
HCT VFR BLD CALC: 43.4 % — SIGNIFICANT CHANGE UP (ref 39–50)
HDLC SERPL-MCNC: 40 MG/DL — SIGNIFICANT CHANGE UP (ref 40–125)
HGB BLD-MCNC: 15 G/DL — SIGNIFICANT CHANGE UP (ref 13–17)
LIPID PNL WITH DIRECT LDL SERPL: 72 MG/DL — SIGNIFICANT CHANGE UP
MCHC RBC-ENTMCNC: 30.8 PG — SIGNIFICANT CHANGE UP (ref 27–34)
MCHC RBC-ENTMCNC: 34.5 GM/DL — SIGNIFICANT CHANGE UP (ref 32–36)
MCV RBC AUTO: 89 FL — SIGNIFICANT CHANGE UP (ref 80–100)
PLATELET # BLD AUTO: 240 K/UL — SIGNIFICANT CHANGE UP (ref 150–400)
POTASSIUM SERPL-MCNC: 4.1 MMOL/L — SIGNIFICANT CHANGE UP (ref 3.5–5.3)
POTASSIUM SERPL-SCNC: 4.1 MMOL/L — SIGNIFICANT CHANGE UP (ref 3.5–5.3)
RBC # BLD: 4.88 M/UL — SIGNIFICANT CHANGE UP (ref 4.2–5.8)
RBC # FLD: 10.8 % — SIGNIFICANT CHANGE UP (ref 10.3–14.5)
SODIUM SERPL-SCNC: 140 MMOL/L — SIGNIFICANT CHANGE UP (ref 135–145)
TOTAL CHOLESTEROL/HDL RATIO MEASUREMENT: 3.2 RATIO — LOW (ref 3.4–9.6)
TRIGL SERPL-MCNC: 72 MG/DL — SIGNIFICANT CHANGE UP (ref 10–149)
TSH SERPL-MCNC: 2.32 UU/ML — SIGNIFICANT CHANGE UP (ref 0.36–3.74)
VALPROATE SERPL-MCNC: 54 UG/ML — SIGNIFICANT CHANGE UP (ref 50–100)
WBC # BLD: 8.4 K/UL — SIGNIFICANT CHANGE UP (ref 3.8–10.5)
WBC # FLD AUTO: 8.4 K/UL — SIGNIFICANT CHANGE UP (ref 3.8–10.5)

## 2017-07-24 RX ORDER — MAGNESIUM HYDROXIDE 400 MG/1
30 TABLET, CHEWABLE ORAL DAILY
Qty: 0 | Refills: 0 | Status: DISCONTINUED | OUTPATIENT
Start: 2017-07-24 | End: 2017-08-04

## 2017-07-24 RX ORDER — DOCUSATE SODIUM 100 MG
100 CAPSULE ORAL DAILY
Qty: 0 | Refills: 0 | Status: DISCONTINUED | OUTPATIENT
Start: 2017-07-24 | End: 2017-08-04

## 2017-07-24 RX ADMIN — DIVALPROEX SODIUM 500 MILLIGRAM(S): 500 TABLET, DELAYED RELEASE ORAL at 21:19

## 2017-07-24 RX ADMIN — HALOPERIDOL DECANOATE 2 MILLIGRAM(S): 100 INJECTION INTRAMUSCULAR at 12:47

## 2017-07-24 RX ADMIN — HALOPERIDOL DECANOATE 5 MILLIGRAM(S): 100 INJECTION INTRAMUSCULAR at 21:19

## 2017-07-24 RX ADMIN — DIVALPROEX SODIUM 500 MILLIGRAM(S): 500 TABLET, DELAYED RELEASE ORAL at 09:23

## 2017-07-24 RX ADMIN — HALOPERIDOL DECANOATE 2 MILLIGRAM(S): 100 INJECTION INTRAMUSCULAR at 09:25

## 2017-07-24 SDOH — SOCIAL STABILITY - SOCIAL INSECURITY: PROBLEM RELATED TO PRIMARY SUPPORT GROUP, UNSPECIFIED: Z63.9

## 2017-07-24 NOTE — PROGRESS NOTE BEHAVIORAL HEALTH - PROBLEM SELECTOR PLAN 2
1.Monitor vital sings   2.Hospitalist H and P. No current need for antihypertensive medication  3.Low salt diet  4.Weight reduction

## 2017-07-24 NOTE — PROGRESS NOTE ADULT - PROBLEM SELECTOR PLAN 2
# Hypertension - discussed further w/ pt. His PCP has not started him on any meds, advised diet control. Tried to call PCP and unable to reach. Nonetheless BP stable mild 140's systolic. Can get elevated whenever aggressive.   - for now lifestyle modifications encouraged.   - no need for meds.   - Low salt diet

## 2017-07-24 NOTE — PROGRESS NOTE ADULT - SUBJECTIVE AND OBJECTIVE BOX
CC:     HPI: This is a 32 yo M with PMH significant for HTN, Hypothyroidism?, Obesity, Bipolar disorder, Hx of Cannabis abuse admitted to 5N from [facility] ED for eval. of insomnia and racing thoughts. Pt was recently discharged from [facility] on 07/20, admitted for manic episode. Pt states that he went home, never slept and started to became aggressive so his mom called to police and sent him to the [facility] ED and from there transferred to  for inpatient psych admission. Pt became severe  aggressive in the ED yesterday, required sedation and restrain. Pt has Hx of multiple SA in the past and medication non compliance. Medicine consult is called  for medical Mx.    Pt states that he feels fine s/p IV ativan yesterday. Pt states that he has Hx fo HTN and takes Amlodipine low dose but does not remember actual dose. Pt states that he has  Hx of hypothyroidism but did not take any meds for the past 2-3 yrs.     7/24/17: Seen comfortable in bed. No complaints. Requests to shower.     ROS: neg unless stated above.   Vital Signs Last 24 Hrs  T(C): 35.6 (24 Jul 2017 09:07), Max: 36.9 (23 Jul 2017 20:23)  T(F): 96.1 (24 Jul 2017 09:07), Max: 98.4 (23 Jul 2017 20:23)  HR: 67 (24 Jul 2017 09:07) (67 - 85)  BP: 144/83 (24 Jul 2017 09:07) (135/86 - 144/83)  BP(mean): --  RR: 14 (24 Jul 2017 09:07) (14 - 14)  SpO2: 100% (24 Jul 2017 09:07) (100% - 100%)    PHYSICAL EXAM:  Constitutional: NAD, awake and alert, well-developed  male.   HEENT: PERR, EOMI, Normal Hearing, MMM  Neck: Soft and supple, No LAD, No JVD  Respiratory: Breath sounds are clear bilaterally, No wheezing, rales or rhonchi  Cardiovascular: S1 and S2, regular rate and rhythm, no Murmurs, gallops or rubs  Gastrointestinal: Bowel Sounds present, soft, nontender, nondistended, no guarding, no rebound  Extremities: No peripheral edema  Vascular: 2+ peripheral pulses  Neurological: A/O x 3, no focal deficits  Musculoskeletal: 5/5 strength b/l upper and lower extremities  Skin: No rashes    MEDICATIONS  (STANDING):  diVALproex  milliGRAM(s) Oral every 12 hours  haloperidol     Tablet 5 milliGRAM(s) Oral at bedtime  haloperidol     Tablet 2 milliGRAM(s) Oral two times a day      LABS: All Labs Reviewed:                        15.0   8.4   )-----------( 240      ( 24 Jul 2017 06:37 )             43.4     07-24    140  |  105  |  13  ----------------------------<  89  4.1   |  28  |  1.08    Ca    9.2      24 Jul 2017 06:37      Thyroid Stimulating Hormone, Serum: 2.320 uU/mL (07.24.17 @ 06:37)

## 2017-07-24 NOTE — PROGRESS NOTE BEHAVIORAL HEALTH - PROBLEM SELECTOR PLAN 3
1.Abilify Maintenna 400 mg IM given on 7/19/17.  2.Plan discussed  with pt who is amenable  to crossover to Haldol now 2 mg bid and 5 mg po qhs with plan for Haldol decanoate as pt reportedly did best clinically with well tolerated Haldol.  3.Pt gave verbal consent for writer to contact pt's mother and pt outpatient treatment team to gather further clinical history to aid in pt treatment and disposition planning

## 2017-07-24 NOTE — PROGRESS NOTE BEHAVIORAL HEALTH - NSBHFUPINTERVALHXFT_PSY_A_CORE
Pt transferred to [facility] psychiatric Unit on 7/23/17 on a 9.13 voluntary legal status  via the ED  for ongoing treatment of the pt's presumptive  schizoaffective d/o - bipolar type  Pt seen in the day room. Nonstop talking with peers, staff and anyone who would listen. Pt hyperkinetic, overly affable but with underlying irritability and barely contained belligerence and volatility. Pt with poor impulse control and very low frustration tolerance. Pt with ongoing impaired judgment and very limited insight into the nature and severity of his illness and the need for ongoing, likely long term pt need for medication compliance and follow up with outpatient treatment   Plan discussed with the pt to resume reportedly more clinically effective and well tolerated Haldol PO with switch over to Haldol decanoate to alleviate pt SAD- bipolar d/o symptoms in the context of a h/o multiple inpatient admissions related to pt treatment nonadherence with resultant clinical relapse and need for emergency readmission to hospital.

## 2017-07-24 NOTE — PROGRESS NOTE BEHAVIORAL HEALTH - NSBHFUPINTERVALCCFT_PSY_A_CORE
" I'm fine! I just didn't sleep for 5 days! The problem is my mother who keeps calling the  because I don't sleep! All she has to do is drive me to the hospital"  When writer asked about pt's mother's safety concerns for the pt and for herself in light of the pt's severe current illnees, pt conceded,' Yeah When she tried to bring me to the hospital on her own, I did some stupid things."     	Patient is a 31 year old single   male from the [place]   former marine, now on disability for mental illness, domiciled with Aunt (who he refers to as his mother) and 2 brothers in [place]. PPH includes Bipolar 1 Disorder & Cannabis abuse, past dx of bipolar disorder vs schizoaffective disorder. He has a hx of non medication compliance. He has a hx of multiple inpatient hospitalizations- recently discharged from [facility] on 7/20/17 where he was admitted with a possible manic episode in context of medication non compliance He has a hx of multiple SA last 5-6 years ago in [place]. He has a hx of aggression/violence when manic and a hx of arrest two years ago for disorderly conduct and 3 years ago for possession of MJ. PMH includes obesity, hypothyroidism & HTN. BIB self with insomnia and complaining of "racing thoughts".     Per ED note of 7/23/17, On arrival to ER, patient was initially cooperative, he ate a sandwich then rather suddenly became acutely agitated, throwing chairs, spitting and punching walls, he required IM haldol 5mg/Benadryl 50mg/ativan 2mg and 4 point restraints, after which patient slept for several hours. On interview, pt is fairly cooperative but intrusive, over expansive mood. He states that he has been unable to sleep since discharge from [facility], and he reports increased energy and goal directed activity. He states that he "voluntarily came in for help" because he has not slept for past 3 days and is afraid that he will become aggressive in the home, which is a consequence of not sleeping. He states that this is the first time that he has willingly sought help, stating "they usually have to call the  on me". He states that he was admitted to [facility] on 7/14 in the context of aggression, making threats to harm others and not sleeping, and had been non compliant with medications, and was discharged on Depakote 500mg hs, trazodone 50mg hs, and haldol 10mg, He states that he was also given "a long term Abilify injection" while in [facility], he is not sure of date. He states that he had an appointment to go to [facility], however he stated that he is refusing to go to [facility] hospital as he perceives this as "too many hours", he has no current outpatient provider having been discharged from [facility] on admission to [facility].   He denies SI/I/P or active HI.  On reflecting on the most recent episode of agitation in  ED, he shows no remorse stating "that girl was disrespectful to me when I asked for another sandwich... that's the way I am since I was a kid". On psychiatric review of sx , he endorses irritability, insomnia, increased goal directed activity, and in the ER is observed to be hyperverbal, grandiose, intrusive and psychomotor agitated and pacing. He denies delusions or AVH and was unable to engage in any meaningful discharge safety planning.      Patient reports he has been complaint with prescribed out pt meds of Depakote, Trazadone and holdol since discharge, however serum valproic acid level was subtherapeutic at 24.8.    Collateral history from patient's mother Lachelle Guadalupe: Mother states that she thinks patient was discharged to [place] from [facility], he hasn't been sleeping for past 3 days, and has been agitated, calling people on the phone at all hours of the morning, talking constantly and loudly, and being verbally aggressive. She states that patient is not currently at baseline, and she is afraid of him. She states that this evening, he told mother that he was going to hospital to get admitted. She is concerned that he is not complaint with medication and is behaving in a disorganized fashion at home, she was upset to hear that he is refusing to go to [facility] and states that she will be unable to convince him to go to [facility] or take medication. Mother does not think he is safe to come home as he is not sleeping and is verbally aggressive.

## 2017-07-25 RX ADMIN — HALOPERIDOL DECANOATE 2 MILLIGRAM(S): 100 INJECTION INTRAMUSCULAR at 13:32

## 2017-07-25 RX ADMIN — DIVALPROEX SODIUM 500 MILLIGRAM(S): 500 TABLET, DELAYED RELEASE ORAL at 08:54

## 2017-07-25 RX ADMIN — HALOPERIDOL DECANOATE 2 MILLIGRAM(S): 100 INJECTION INTRAMUSCULAR at 08:55

## 2017-07-25 RX ADMIN — HALOPERIDOL DECANOATE 5 MILLIGRAM(S): 100 INJECTION INTRAMUSCULAR at 20:39

## 2017-07-25 RX ADMIN — DIVALPROEX SODIUM 500 MILLIGRAM(S): 500 TABLET, DELAYED RELEASE ORAL at 20:39

## 2017-07-25 NOTE — PROGRESS NOTE BEHAVIORAL HEALTH - ORIENTATION OTHER
pt minimizing need for admission. " I brought myself here!" pt minimizing need for admission. " I brought myself here! I supposed to go fishing this weekend with my friends"

## 2017-07-25 NOTE — PROGRESS NOTE BEHAVIORAL HEALTH - OTHER
superficially cooperative with irritable edge " I feel great!" pt remains intrusive, easily distracted and with poor boundaries despite staff re direction often difficult to interrupt and not always goal-directed

## 2017-07-25 NOTE — PROGRESS NOTE BEHAVIORAL HEALTH - PROBLEM SELECTOR PLAN 2
1.Monitor vital sings   2.Hospitalist H and P. No current need for antihypertensive medication  3.Low salt diet  4.Weight reduction 1.Monitor vital sings   2.Hospitalist H and P. No current need for antihypertensive medication  3.Low salt diet. No further treatment needed at this time.  4.Weight reduction

## 2017-07-25 NOTE — PROGRESS NOTE BEHAVIORAL HEALTH - NSBHFUPINTERVALHXFT_PSY_A_CORE
Pt is a 31 yr-old  male with transferred to  psychiatric Unit on 7/23/17 on a 9.13 voluntary legal status  via the ED  for ongoing treatment of the pt's presumptive  schizoaffective d/o - bipolar type , comorbid Cannabis use disorder and longstanding pt treatment noncompliance  with resultant pt readmission to hospital due to worsening manic aggression.    Per phone  conversation on 7/25/17 with pt's aunt, she reported to writer that the pt has a long h/o treatment noncompliance, impaired judgment, limited insight into his illness and frequent pt aggression and assaultive behavior at home in the context of worsening seferino such that she has to call police for safety concerns to have pt brought to hospital ER for evaluation. Ms Guadalupe  spoke of  ongoing pt safety concerns due to pt's belligerence and threatening behavior at home when pt is med noncompliant and becomes increasingly manic with decreased need for sleep and with worsening behavioral dyscontrol, 'calling people all through the night and yelling at them, getting mad  for no reason." She noted her safety concerns related to pt ( pt's mistakenly insisting he has no h/o violence). She spoke of phone calls from the pt demanding she tell hospital staff that the pt is 'fine and ready to come home but I know  he is not." Pt is a 31 yr-old  male with transferred to [facility] psychiatric Unit on 7/23/17 on a 9.13 voluntary legal status  via the ED  for ongoing treatment of the pt's presumptive  schizoaffective d/o - bipolar type , comorbid Cannabis use disorder and longstanding pt treatment noncompliance  with resultant pt readmission to hospital due to worsening manic aggression.    Per phone  conversation on 7/25/17 with pt's aunt, she reported to writer that the pt has a long h/o treatment noncompliance, impaired judgment, limited insight into his illness and frequent pt aggression and assaultive behavior at home in the context of worsening seferino such that she has to call police for safety concerns to have pt brought to hospital ER for evaluation. Ms Guadalupe  spoke of  ongoing pt safety concerns due to pt's belligerence and threatening behavior at home when pt is med noncompliant and becomes increasingly manic with decreased need for sleep and with worsening behavioral dyscontrol, 'calling people all through the night and yelling at them, getting mad  for no reason." She noted her safety concerns related to pt ( pt's mistakenly insisting he has no h/o violence). She spoke of phone calls from the pt demanding she tell hospital staff that the pt is 'fine and ready to come home but I know  he is not."   Writer reviewed with the pt and later with Ms Guadalupe the plan to resume pt's previously very effective Haldol PO , then with change over to Haldol decanoate to better address the pt's longstanding severe SAD-bipolar type d/o with a pt h/o treatment noncompliance as above . Pt amenable to plan.  Pt tolerating current med regimen without side effects but with full clinical efficacy too early to gauge. Pt attending therapy groups but remains manic, intrusive, in and out of groups and distracted by every sound and site  he encounters.

## 2017-07-25 NOTE — PROGRESS NOTE BEHAVIORAL HEALTH - PROBLEM SELECTOR PLAN 1
1. Rechecked TSH- WNL.   2.Hospitalist  follow up. 1. Rechecked TSH- WNL.   2.Hospitalist  follow up.  No further treatment needed.

## 2017-07-25 NOTE — PROGRESS NOTE BEHAVIORAL HEALTH - NSBHFUPINTERVALCCFT_PSY_A_CORE
' I'm feeling great!! I want you to know that  I am a very busy man. Every day I am in here  is a day I am missing out with the world. With my friends. My girlfriends. My music.    Writer had pt verbal consent to speak with the pt's aunt ( whom pt calls 'mother')  ( Lachelle Guadalupe tel [phone])  on 7/25/17 to review pt history . ' I'm feeling great!! I want you to know that  I am a very busy man. Every day I am in here  is a day I am missing out with the world. With my friends. My girlfriends. My music.    Writer had pt verbal consent to speak with the pt's aunt ( whom pt calls 'mother')  ( Lachelle Guadalupe tel [phone])  on 7/25/17 to review pt history .   Plan to recheck Depakote level, CBC and CMP on 7/27/17.  Pt with normal TSH and vital signs remain largely stable.

## 2017-07-25 NOTE — PROGRESS NOTE BEHAVIORAL HEALTH - NSBHCHARTREVIEWVS_PSY_A_CORE FT
Vital Signs Last 24 Hrs  T(C): 35.6 (24 Jul 2017 09:07), Max: 36.9 (23 Jul 2017 20:23)  T(F): 96.1 (24 Jul 2017 09:07), Max: 98.4 (23 Jul 2017 20:23)  HR: 67 (24 Jul 2017 09:07) (67 - 85)  BP: 144/83 (24 Jul 2017 09:07) (135/86 - 144/83)  BP(mean): --  RR: 14 (24 Jul 2017 09:07) (14 - 14)  SpO2: 100% (24 Jul 2017 09:07) (100% - 100%) Vital Signs Last 24 Hrs  T(C): 36.5 (25 Jul 2017 07:14), Max: 36.5 (25 Jul 2017 07:14)  T(F): 97.7 (25 Jul 2017 07:14), Max: 97.7 (25 Jul 2017 07:14)  HR: 79 (25 Jul 2017 07:14) (79 - 79)  BP: 138/86 (25 Jul 2017 07:14) (138/86 - 138/86)  BP(mean): --  RR: 16 (25 Jul 2017 07:14) (16 - 16)  SpO2: 99% (25 Jul 2017 07:14) (99% - 99%)

## 2017-07-25 NOTE — PROGRESS NOTE BEHAVIORAL HEALTH - PROBLEM SELECTOR PLAN 3
1.Abilify Maintenna 400 mg IM given on 7/19/17.  2.Plan discussed  with pt who is amenable  to crossover to Haldol now 2 mg bid and 5 mg po qhs with plan for Haldol decanoate as pt reportedly did best clinically with well tolerated Haldol.  3.Pt gave verbal consent for writer to contact pt's mother and pt outpatient treatment team to gather further clinical history to aid in pt treatment and disposition planning 1.Abilify Maintenna 400 mg IM given on 7/19/17.  2.The  pt  is amenable  to crossover to Haldol now 2 mg bid and 5 mg po qhs with plan for Haldol decanoate as pt reportedly did best clinically with well tolerated Haldol.  3.Pt gave verbal consent for writer to contact pt's mother and pt outpatient treatment team to gather further clinical history to aid in pt treatment and disposition planning  4. Recheck Depakote level, CBC and CMP on 7/27/17   5. Pt gave writer verbal consent to contact pt's aunt Ms Guadalupe ( phone call 0n 7/25/17) for further pt clinical history to aid in diagnosis and in disposition planning.)  6.Encourage pt to attend therapy groups  as tolerated

## 2017-07-26 RX ORDER — HALOPERIDOL DECANOATE 100 MG/ML
5 INJECTION INTRAMUSCULAR
Qty: 0 | Refills: 0 | Status: DISCONTINUED | OUTPATIENT
Start: 2017-07-26 | End: 2017-07-28

## 2017-07-26 RX ORDER — TRIHEXYPHENIDYL HCL 2 MG
1 TABLET ORAL
Qty: 0 | Refills: 0 | Status: DISCONTINUED | OUTPATIENT
Start: 2017-07-26 | End: 2017-07-31

## 2017-07-26 RX ADMIN — HALOPERIDOL DECANOATE 2 MILLIGRAM(S): 100 INJECTION INTRAMUSCULAR at 13:45

## 2017-07-26 RX ADMIN — HALOPERIDOL DECANOATE 2 MILLIGRAM(S): 100 INJECTION INTRAMUSCULAR at 09:14

## 2017-07-26 RX ADMIN — DIVALPROEX SODIUM 500 MILLIGRAM(S): 500 TABLET, DELAYED RELEASE ORAL at 09:13

## 2017-07-26 RX ADMIN — HALOPERIDOL DECANOATE 5 MILLIGRAM(S): 100 INJECTION INTRAMUSCULAR at 20:41

## 2017-07-26 RX ADMIN — DIVALPROEX SODIUM 500 MILLIGRAM(S): 500 TABLET, DELAYED RELEASE ORAL at 20:41

## 2017-07-26 RX ADMIN — Medication 1 MILLIGRAM(S): at 20:41

## 2017-07-26 NOTE — PROGRESS NOTE BEHAVIORAL HEALTH - NSBHFUPINTERVALHXFT_PSY_A_CORE
Pt is a 31 yr-old  male with transferred to [facility] psychiatric Unit on 7/23/17 on a 9.13 voluntary legal status  via the ED  for ongoing treatment of the pt's presumptive  schizoaffective d/o - bipolar type , comorbid Cannabis use disorder and longstanding pt treatment noncompliance  with resultant pt readmission to hospital due to worsening manic aggression.    Per phone  conversation on 7/25/17 with pt's aunt, she reported to writer that the pt has a long h/o treatment noncompliance, impaired judgment, limited insight into his illness and frequent pt aggression and assaultive behavior at home in the context of worsening seferino such that she has to call police for safety concerns to have pt brought to hospital ER for evaluation. Ms Guadalupe  spoke of  ongoing pt safety concerns due to pt's belligerence and threatening behavior at home when pt is med noncompliant and becomes increasingly manic with decreased need for sleep and with worsening behavioral dyscontrol, 'calling people all through the night and yelling at them, getting mad  for no reason." She noted her safety concerns related to pt ( pt's mistakenly insisting he has no h/o violence). She spoke of phone calls from the pt demanding she tell hospital staff that the pt is 'fine and ready to come home but I know  he is not."   Writer reviewed with the pt and later with Ms Guadalupe the plan to resume pt's previously very effective Haldol PO , then with change over to Haldol decanoate to better address the pt's longstanding severe SAD-bipolar type d/o with a pt h/o treatment noncompliance as above . Pt amenable to plan.  Pt tolerating current med regimen without side effects but with full clinical efficacy too early to gauge. Pt attending therapy groups but remains manic, intrusive, in and out of groups and distracted by every sound and site  he encounters. Pt remains hypomanic, with hyperverbal, grandiose clinical presentation and with inappropriate sociability and  with well intentioned though still inappropriate intrusive behavior . Writer again reviewed plan for the pt to continue with Haldol and Depakote, with plan to forego prior seemingly less clinically effective Abilify , with plan to resume Haldol decanoate, a medication which was more effective and equally well tolerated by the pt ( per pt's family's report). Pt amenable to plan.

## 2017-07-26 NOTE — PROGRESS NOTE BEHAVIORAL HEALTH - PROBLEM SELECTOR PLAN 3
1 DC .Abilify  after last dose of Abilify Maintenna 400 mg IM given on 7/19/17.( due to less overall clinical efficacy)   2. To increase restarted to  Haldol 5 mg po q 12   with plan for Haldol decanoate as pt reportedly did best clinically with well tolerated Haldol.  3.Pt gave verbal consent for writer to contact pt's mother and pt outpatient treatment team to gather further clinical history to aid in pt treatment and disposition planning  4. Recheck Depakote level, ( dose now increased to 500 mg po q 12 )  CBC and CMP on 7/27/17   5. Pt gave writer verbal consent to contact pt's aunt Ms Guadalupe ( phone call 0n 7/25/17) for further pt clinical history to aid in diagnosis and in disposition planning.)( contacted on 7/25/17)  6.Encourage pt to attend therapy groups  as tolerated

## 2017-07-26 NOTE — PROGRESS NOTE BEHAVIORAL HEALTH - NSBHFUPINTERVALCCFT_PSY_A_CORE
" I'm doing great! I put myself here voluntary because I wasn't sleeping when I left the last hospital. My mother makes me edgy. Like I'm going to lose it when she always calls the  on me for no reason. She needs to work with me. When I have this energy and I have a lot of things I need to get done.".    Writer spoke with the pt about a phone conversation this writer had with the pt's aunt ( whom pt calls 'mother')  ( Lachelle Guadalupe tel [phone])  on 7/25/17 to review pt clinical history .Pt appeared surprised, perplexed and irritated by his mother's reported safety concerns and at times reported fears for her safety when pt is med noncompliant and becomes manic with verbally  and at times physically threatening towards her and others.   Plan to recheck Depakote level, CBC and CMP on 7/27/17.  Pt with normal TSH and vital signs remain largely stable.

## 2017-07-26 NOTE — PROGRESS NOTE BEHAVIORAL HEALTH - PROBLEM SELECTOR PLAN 2
1.Monitor vital sings   2.Hospitalist H and P. No current need for antihypertensive medication  3.Low salt diet. No further treatment needed at this time.  4.Weight reduction

## 2017-07-26 NOTE — PROGRESS NOTE BEHAVIORAL HEALTH - NSBHCHARTREVIEWVS_PSY_A_CORE FT
Vital Signs Last 24 Hrs  T(C): 36.1 (26 Jul 2017 07:12), Max: 36.1 (26 Jul 2017 07:12)  T(F): 97 (26 Jul 2017 07:12), Max: 97 (26 Jul 2017 07:12)  HR: 71 (26 Jul 2017 07:12) (71 - 71)  BP: 136/80 (26 Jul 2017 07:12) (136/80 - 136/80)  BP(mean): --  RR: 16 (26 Jul 2017 07:12) (16 - 16)  SpO2: 99% (26 Jul 2017 07:1

## 2017-07-26 NOTE — PROGRESS NOTE BEHAVIORAL HEALTH - OTHER
superficially cooperative with irritable edge pt remains intrusive, easily distracted and with poor boundaries despite staff re direction often difficult to interrupt and not always goal-directed " I feel great!"

## 2017-07-27 LAB
ALBUMIN SERPL ELPH-MCNC: 3.7 G/DL — SIGNIFICANT CHANGE UP (ref 3.3–5)
ALP SERPL-CCNC: 47 U/L — SIGNIFICANT CHANGE UP (ref 40–120)
ALT FLD-CCNC: 28 U/L — SIGNIFICANT CHANGE UP (ref 12–78)
ANION GAP SERPL CALC-SCNC: 4 MMOL/L — LOW (ref 5–17)
AST SERPL-CCNC: 21 U/L — SIGNIFICANT CHANGE UP (ref 15–37)
BASOPHILS # BLD AUTO: 0.1 K/UL — SIGNIFICANT CHANGE UP (ref 0–0.2)
BASOPHILS NFR BLD AUTO: 2 % — SIGNIFICANT CHANGE UP (ref 0–2)
BILIRUB SERPL-MCNC: 1.1 MG/DL — SIGNIFICANT CHANGE UP (ref 0.2–1.2)
BUN SERPL-MCNC: 13 MG/DL — SIGNIFICANT CHANGE UP (ref 7–23)
CALCIUM SERPL-MCNC: 9.4 MG/DL — SIGNIFICANT CHANGE UP (ref 8.5–10.1)
CHLORIDE SERPL-SCNC: 105 MMOL/L — SIGNIFICANT CHANGE UP (ref 96–108)
CO2 SERPL-SCNC: 31 MMOL/L — SIGNIFICANT CHANGE UP (ref 22–31)
CREAT SERPL-MCNC: 1.03 MG/DL — SIGNIFICANT CHANGE UP (ref 0.5–1.3)
EOSINOPHIL # BLD AUTO: 0.1 K/UL — SIGNIFICANT CHANGE UP (ref 0–0.5)
EOSINOPHIL NFR BLD AUTO: 1.4 % — SIGNIFICANT CHANGE UP (ref 0–6)
GLUCOSE SERPL-MCNC: 85 MG/DL — SIGNIFICANT CHANGE UP (ref 70–99)
HCT VFR BLD CALC: 48.6 % — SIGNIFICANT CHANGE UP (ref 39–50)
HGB BLD-MCNC: 15.2 G/DL — SIGNIFICANT CHANGE UP (ref 13–17)
LYMPHOCYTES # BLD AUTO: 2.4 K/UL — SIGNIFICANT CHANGE UP (ref 1–3.3)
LYMPHOCYTES # BLD AUTO: 39.3 % — SIGNIFICANT CHANGE UP (ref 13–44)
MCHC RBC-ENTMCNC: 28.1 PG — SIGNIFICANT CHANGE UP (ref 27–34)
MCHC RBC-ENTMCNC: 31.3 GM/DL — LOW (ref 32–36)
MCV RBC AUTO: 89.6 FL — SIGNIFICANT CHANGE UP (ref 80–100)
MONOCYTES # BLD AUTO: 0.7 K/UL — SIGNIFICANT CHANGE UP (ref 0–0.9)
MONOCYTES NFR BLD AUTO: 10.7 % — SIGNIFICANT CHANGE UP (ref 2–14)
NEUTROPHILS # BLD AUTO: 2.9 K/UL — SIGNIFICANT CHANGE UP (ref 1.8–7.4)
NEUTROPHILS NFR BLD AUTO: 46.5 % — SIGNIFICANT CHANGE UP (ref 43–77)
PLATELET # BLD AUTO: 269 K/UL — SIGNIFICANT CHANGE UP (ref 150–400)
POTASSIUM SERPL-MCNC: 4.4 MMOL/L — SIGNIFICANT CHANGE UP (ref 3.5–5.3)
POTASSIUM SERPL-SCNC: 4.4 MMOL/L — SIGNIFICANT CHANGE UP (ref 3.5–5.3)
PROT SERPL-MCNC: 6.2 GM/DL — SIGNIFICANT CHANGE UP (ref 6–8.3)
RBC # BLD: 5.42 M/UL — SIGNIFICANT CHANGE UP (ref 4.2–5.8)
RBC # FLD: 11.3 % — SIGNIFICANT CHANGE UP (ref 10.3–14.5)
SODIUM SERPL-SCNC: 140 MMOL/L — SIGNIFICANT CHANGE UP (ref 135–145)
VALPROATE SERPL-MCNC: 85 UG/ML — SIGNIFICANT CHANGE UP (ref 50–100)
WBC # BLD: 6.2 K/UL — SIGNIFICANT CHANGE UP (ref 3.8–10.5)
WBC # FLD AUTO: 6.2 K/UL — SIGNIFICANT CHANGE UP (ref 3.8–10.5)

## 2017-07-27 RX ADMIN — Medication 1 MILLIGRAM(S): at 08:54

## 2017-07-27 RX ADMIN — DIVALPROEX SODIUM 500 MILLIGRAM(S): 500 TABLET, DELAYED RELEASE ORAL at 08:53

## 2017-07-27 RX ADMIN — DIVALPROEX SODIUM 500 MILLIGRAM(S): 500 TABLET, DELAYED RELEASE ORAL at 20:57

## 2017-07-27 RX ADMIN — HALOPERIDOL DECANOATE 5 MILLIGRAM(S): 100 INJECTION INTRAMUSCULAR at 08:53

## 2017-07-27 RX ADMIN — HALOPERIDOL DECANOATE 5 MILLIGRAM(S): 100 INJECTION INTRAMUSCULAR at 20:57

## 2017-07-27 RX ADMIN — Medication 1 MILLIGRAM(S): at 20:56

## 2017-07-27 NOTE — PROGRESS NOTE BEHAVIORAL HEALTH - NSBHFUPINTERVALCCFT_PSY_A_CORE
" You know I have a lot of things to do . You're keeping me here longer because I was just in another hospital, right? I get that. But my mom shouldn't being calling the  all the time. Just because I don't sleep for a few days. It makes me mad."    Writer spoke with the pt about a phone conversation this writer had with the pt's aunt ( whom pt calls 'mother')  ( Lachelle Guadalupe tel [phone])  on 7/25/17 to review pt clinical history .Pt appeared surprised, perplexed and irritated by his mother's reported safety concerns and at times reported fears for her safety when pt is med noncompliant and becomes manic with verbally  and at times physically threatening towards her and others.     Labs rechecked  on 7/27/17  Depakote level, CBC and CMP    CBC, CMP WNL  Platelets = 269 K  Depakote level = 85 ug/ml  Pt with normal TSH and vital signs remain largely stable.

## 2017-07-27 NOTE — PROGRESS NOTE BEHAVIORAL HEALTH - PROBLEM SELECTOR PLAN 3
1 DC .Abilify  after last dose of Abilify Maintenna 400 mg IM given on 7/19/17.( due to less overall clinical efficacy)   2. To increase restarted to  Haldol 5 mg po q 12   with plan for Haldol decanoate as pt reportedly did best clinically with well tolerated Haldol.  3.Pt gave verbal consent for writer to contact pt's mother and pt outpatient treatment team to gather further clinical history to aid in pt treatment and disposition planning  4. Rechecked  Depakote level, ( dose now increased to 500 mg po q 12 )  CBC and CMP on 7/27/17   5. Pt gave writer verbal consent to contact pt's aunt Ms Guadalupe ( phone call 0n 7/25/17) for further pt clinical history to aid in diagnosis and in disposition planning.)( contacted on 7/25/17)  6.Encourage pt to attend therapy groups  as tolerated  7. Plan to begin Haldol decanoate q monthly for ongoing alleviation of pt's severe bipolar d/o in the context of  pt h/o entrenched treatment noncompliance

## 2017-07-27 NOTE — PROGRESS NOTE BEHAVIORAL HEALTH - NSBHCHARTREVIEWVS_PSY_A_CORE FT
Vital Signs Last 24 Hrs  T(C): 36.6 (27 Jul 2017 08:00), Max: 36.6 (27 Jul 2017 08:00)  T(F): 97.9 (27 Jul 2017 08:00), Max: 97.9 (27 Jul 2017 08:00)  HR: 75 (27 Jul 2017 08:00) (75 - 75)  BP: 138/78 (27 Jul 2017 08:00) (138/78 - 138/78)  BP(mean): --  RR: 14 (27 Jul 2017 08:00) (14 - 14)  SpO2: 100% (27 Jul 2017 08:00) (100% - 100%)

## 2017-07-27 NOTE — PROGRESS NOTE BEHAVIORAL HEALTH - OTHER
superficially cooperative with irritable edge pt remains intrusive, easily distracted and with poor boundaries despite staff re direction rapid but not quite pressured and slowly  becoming more goal-directed " I feel great!"

## 2017-07-28 RX ORDER — HALOPERIDOL DECANOATE 100 MG/ML
10 INJECTION INTRAMUSCULAR AT BEDTIME
Qty: 0 | Refills: 0 | Status: DISCONTINUED | OUTPATIENT
Start: 2017-07-28 | End: 2017-08-04

## 2017-07-28 RX ORDER — HALOPERIDOL DECANOATE 100 MG/ML
5 INJECTION INTRAMUSCULAR DAILY
Qty: 0 | Refills: 0 | Status: DISCONTINUED | OUTPATIENT
Start: 2017-07-29 | End: 2017-08-04

## 2017-07-28 RX ADMIN — DIVALPROEX SODIUM 500 MILLIGRAM(S): 500 TABLET, DELAYED RELEASE ORAL at 20:57

## 2017-07-28 RX ADMIN — HALOPERIDOL DECANOATE 5 MILLIGRAM(S): 100 INJECTION INTRAMUSCULAR at 08:54

## 2017-07-28 RX ADMIN — HALOPERIDOL DECANOATE 10 MILLIGRAM(S): 100 INJECTION INTRAMUSCULAR at 20:57

## 2017-07-28 RX ADMIN — DIVALPROEX SODIUM 500 MILLIGRAM(S): 500 TABLET, DELAYED RELEASE ORAL at 08:53

## 2017-07-28 RX ADMIN — Medication 1 MILLIGRAM(S): at 08:53

## 2017-07-28 RX ADMIN — Medication 1 MILLIGRAM(S): at 21:00

## 2017-07-28 NOTE — PROGRESS NOTE BEHAVIORAL HEALTH - OTHER
superficially cooperative with irritable edge pt remains intrusive, easily distracted and with poor boundaries despite staff re direction rapid but not quite pressured and slowly  becoming more goal-directed " I feel great. Just ask anybody!!"

## 2017-07-28 NOTE — PROGRESS NOTE BEHAVIORAL HEALTH - NSBHFUPINTERVALHXFT_PSY_A_CORE
Pt seen multiple times throughout the day and in the day room for a seated conversation. Pt remains hypomanic and more overtly paranoid and hypersensitive to perceived criticism. Pt did not appear reassured when told by writer that new cameras installed by the hospital over recent days were simply cameras designed to provide ayala access to day room area as part of safety monitoring for patients and staff and  not microphones  or 'listening devices " as per pt concern. Pt also failed to mention a last evening pt volatile agitated incident related merely to pt request for a room change with his 'friend'. Pt with ongoing short fuse and low frustration tolerance. Unable to calm himself despite staff interventions and supports. Pt able to ultimately regroup once hospital security was contacted. Pt continues with impaired judgment and limited insight into the nature and severity of his illness  and the need for ongoing, consistent treatment. Discussed plan to increase pt's Haldol dose from 10 to 15 mg po /day with plan for Haldol decanoate injection to begin next week and to replace prior , less effective Abilify trial. Pt amenable to plan.

## 2017-07-28 NOTE — PROGRESS NOTE BEHAVIORAL HEALTH - NSBHCHARTREVIEWVS_PSY_A_CORE FT
Vital Signs Last 24 Hrs  T(C): 36.7 (28 Jul 2017 07:22), Max: 36.7 (28 Jul 2017 07:22)  T(F): 98 (28 Jul 2017 07:22), Max: 98 (28 Jul 2017 07:22)  HR: 75 (28 Jul 2017 07:22) (75 - 75)  BP: 135/79 (28 Jul 2017 07:22) (135/79 - 135/79)  BP(mean): --  RR: 14 (28 Jul 2017 07:22) (14 - 14)  SpO2: 98% (28 Jul 2017 07:22) (98% - 98%)

## 2017-07-28 NOTE — PROGRESS NOTE BEHAVIORAL HEALTH - PROBLEM SELECTOR PLAN 3
1 DC .Abilify  after last dose of Abilify Maintenna 400 mg IM given on 7/19/17.( due to less overall clinical efficacy)   2. To increase restarted to  Haldol 5 mg po q am and 10 mg po q hs   with plan for Haldol decanoate as pt reportedly did best clinically with well tolerated Haldol.  3.Pt gave verbal consent for writer to contact pt's mother and pt outpatient treatment team to gather further clinical history to aid in pt treatment and disposition planning  4. Rechecked  Depakote level, ( dose now increased to 500 mg po q 12 )  CBC and CMP on 7/27/17   5. Pt gave writer verbal consent to contact pt's aunt Ms Guadalupe ( phone call 0n 7/25/17) for further pt clinical history to aid in diagnosis and in disposition planning.)( contacted on 7/25/17)  6.Encourage pt to attend therapy groups  as tolerated  7. Plan to begin Haldol decanoate q monthly for ongoing alleviation of pt's severe bipolar d/o in the context of  pt h/o entrenched treatment noncompliance

## 2017-07-28 NOTE — PROGRESS NOTE BEHAVIORAL HEALTH - NSBHFUPINTERVALCCFT_PSY_A_CORE
" I'm doing great! Sleeping great. Feeling great.  Can we talk someplace private? Look at all these cameras and listening devices in her. They  sounds , right?""      Labs rechecked  on 7/27/17  Depakote level, CBC and CMP    CBC, CMP WNL  Platelets = 269 K  Depakote level = 85 ug/ml  Pt with normal TSH and vital signs remain largely stable.

## 2017-07-29 RX ADMIN — DIVALPROEX SODIUM 500 MILLIGRAM(S): 500 TABLET, DELAYED RELEASE ORAL at 20:43

## 2017-07-29 RX ADMIN — HALOPERIDOL DECANOATE 10 MILLIGRAM(S): 100 INJECTION INTRAMUSCULAR at 20:43

## 2017-07-29 RX ADMIN — Medication 1 MILLIGRAM(S): at 20:43

## 2017-07-29 RX ADMIN — HALOPERIDOL DECANOATE 5 MILLIGRAM(S): 100 INJECTION INTRAMUSCULAR at 09:20

## 2017-07-29 RX ADMIN — Medication 1 MILLIGRAM(S): at 09:20

## 2017-07-29 RX ADMIN — DIVALPROEX SODIUM 500 MILLIGRAM(S): 500 TABLET, DELAYED RELEASE ORAL at 09:19

## 2017-07-30 RX ORDER — DIVALPROEX SODIUM 500 MG/1
500 TABLET, DELAYED RELEASE ORAL DAILY
Qty: 0 | Refills: 0 | Status: DISCONTINUED | OUTPATIENT
Start: 2017-07-31 | End: 2017-08-04

## 2017-07-30 RX ORDER — DIVALPROEX SODIUM 500 MG/1
750 TABLET, DELAYED RELEASE ORAL AT BEDTIME
Qty: 0 | Refills: 0 | Status: DISCONTINUED | OUTPATIENT
Start: 2017-07-30 | End: 2017-08-04

## 2017-07-30 RX ADMIN — DIVALPROEX SODIUM 500 MILLIGRAM(S): 500 TABLET, DELAYED RELEASE ORAL at 08:54

## 2017-07-30 RX ADMIN — Medication 1 MILLIGRAM(S): at 20:46

## 2017-07-30 RX ADMIN — Medication 1 MILLIGRAM(S): at 08:54

## 2017-07-30 RX ADMIN — HALOPERIDOL DECANOATE 5 MILLIGRAM(S): 100 INJECTION INTRAMUSCULAR at 08:54

## 2017-07-30 RX ADMIN — DIVALPROEX SODIUM 750 MILLIGRAM(S): 500 TABLET, DELAYED RELEASE ORAL at 20:46

## 2017-07-30 RX ADMIN — HALOPERIDOL DECANOATE 10 MILLIGRAM(S): 100 INJECTION INTRAMUSCULAR at 20:46

## 2017-07-31 RX ORDER — HALOPERIDOL DECANOATE 100 MG/ML
100 INJECTION INTRAMUSCULAR
Qty: 0 | Refills: 0 | Status: DISCONTINUED | OUTPATIENT
Start: 2017-08-01 | End: 2017-08-04

## 2017-07-31 RX ORDER — TRIHEXYPHENIDYL HCL 2 MG
2 TABLET ORAL
Qty: 0 | Refills: 0 | Status: DISCONTINUED | OUTPATIENT
Start: 2017-07-31 | End: 2017-08-04

## 2017-07-31 RX ADMIN — DIVALPROEX SODIUM 500 MILLIGRAM(S): 500 TABLET, DELAYED RELEASE ORAL at 08:50

## 2017-07-31 RX ADMIN — DIVALPROEX SODIUM 750 MILLIGRAM(S): 500 TABLET, DELAYED RELEASE ORAL at 20:33

## 2017-07-31 RX ADMIN — Medication 1 MILLIGRAM(S): at 08:50

## 2017-07-31 RX ADMIN — HALOPERIDOL DECANOATE 5 MILLIGRAM(S): 100 INJECTION INTRAMUSCULAR at 08:50

## 2017-07-31 RX ADMIN — Medication 2 MILLIGRAM(S): at 20:33

## 2017-07-31 RX ADMIN — HALOPERIDOL DECANOATE 10 MILLIGRAM(S): 100 INJECTION INTRAMUSCULAR at 20:33

## 2017-07-31 NOTE — PROGRESS NOTE BEHAVIORAL HEALTH - NSBHFUPINTERVALCCFT_PSY_A_CORE
' I'm doing great. I'm ready for discharge. I want to leave in the next day or 2."        Labs rechecked  on 7/27/17  Depakote level, CBC and CMP    CBC, CMP WNL  Platelets = 269 K  Depakote level = 85 ug/ml  Pt with normal TSH and vital signs remain largely stable.

## 2017-07-31 NOTE — PROGRESS NOTE BEHAVIORAL HEALTH - NSBHFUPINTERVALHXFT_PSY_A_CORE
Pt seen. More irritable at times with ongoing low frustration tolerance. Writer spoke with pt's 'mother ' again with pt's permission ( Lachelle Guadalupe tel [phone]) Pt remains hypomanic and more overtly paranoid and hypersensitive to perceived criticism. Pt tolerating increased dose of Haldol with plan for Haldol decanoate 100 mg IM to be given on 8/1/17 with second part of Haldol 50 mg IM to be given in 1 week, with q  monthly Haldol decanoate 150 mg IM thereafter to alleviate ongoing pt schizoaffective d/o- bipolar type psychosis.   Lab work planned for 8/2/17 to include recheck of Depakote level after recent increase in dose due to pt's ongoing  hypomania. Pt continues with impaired judgment and limited insight into the nature and severity of his illness.

## 2017-07-31 NOTE — PROGRESS NOTE BEHAVIORAL HEALTH - NSBHCHARTREVIEWVS_PSY_A_CORE FT
Vital Signs Last 24 Hrs  T(C): 36.7 (31 Jul 2017 08:15), Max: 36.7 (31 Jul 2017 08:15)  T(F): 98.1 (31 Jul 2017 08:15), Max: 98.1 (31 Jul 2017 08:15)  HR: 70 (31 Jul 2017 08:15) (70 - 70)  BP: 145/78 (31 Jul 2017 08:15) (145/78 - 145/78)  BP(mean): --  RR: 14 (31 Jul 2017 08:15) (14 - 14)  SpO2: 99% (31 Jul 2017 08:15) (99% - 99%)

## 2017-07-31 NOTE — PROGRESS NOTE BEHAVIORAL HEALTH - PROBLEM SELECTOR PLAN 3
1 DC .Abilify  after last dose of Abilify Maintenna 400 mg IM given on 7/19/17.( due to less overall clinical efficacy)   2. To increase restarted to  Haldol 5 mg po q am and 10 mg po q hs   with plan for Haldol decanoate  100 mg IM to be given on 8/1/17.as pt reportedly did best clinically with well tolerated Haldol.  3.Pt gave verbal consent for writer to contact pt's mother and pt outpatient treatment team to gather further clinical history to aid in pt treatment and disposition planning  4.Plan to recheck   Depakote level, ( dose now increased to 500 mg po q  am and 750 mg po qhs )  CBC and CMP on 8/2//17   5. Pt gave writer verbal consent to contact pt's aunt Ms Guadalupe ( phone call 0n 7/25/17) for further pt clinical history to aid in diagnosis and in disposition planning.)( contacted on 7/25/17 and again on 7/31/17)  6.Encourage pt to attend therapy groups  as tolerated  7. Plan to recommend pt continue Haldol decanoate q monthly for ongoing alleviation of pt's severe bipolar d/o in the context of  pt h/o entrenched treatment noncompliance

## 2017-08-01 RX ADMIN — DIVALPROEX SODIUM 750 MILLIGRAM(S): 500 TABLET, DELAYED RELEASE ORAL at 21:11

## 2017-08-01 RX ADMIN — Medication 2 MILLIGRAM(S): at 09:03

## 2017-08-01 RX ADMIN — HALOPERIDOL DECANOATE 10 MILLIGRAM(S): 100 INJECTION INTRAMUSCULAR at 21:11

## 2017-08-01 RX ADMIN — Medication 2 MILLIGRAM(S): at 21:11

## 2017-08-01 RX ADMIN — DIVALPROEX SODIUM 500 MILLIGRAM(S): 500 TABLET, DELAYED RELEASE ORAL at 09:03

## 2017-08-01 RX ADMIN — HALOPERIDOL DECANOATE 5 MILLIGRAM(S): 100 INJECTION INTRAMUSCULAR at 09:03

## 2017-08-01 RX ADMIN — HALOPERIDOL DECANOATE 100 MILLIGRAM(S): 100 INJECTION INTRAMUSCULAR at 09:03

## 2017-08-01 NOTE — ED BEHAVIORAL HEALTH ASSESSMENT NOTE - NS ED BHA MSE SPEECH RATE
Pt is a 76 y/o  female admitted with femur fracture. Pt is bed bound and confused at her baseline. Per the nephew, pt has nurse that comes in 2-3 times per week and visits with the pt for an hour. PT recommending SNF. Pt family stated pt has been to SNF in the past and did not participate with therapy. Family requested referral be sent to [facility]. Referral sent for their review. [facility] stated they would come and evaluate pt but they would not have a bed available for a few days. Per nephew he requested a referral be sent to [facility] as the pt had been there previously. Referral sent to [facility] and they accepted. SW provided family (kishor and nephew) of info from both facilities and they chose to take the pt home. SW arranged for transport to arrive a 6:00PM.  SW inquired if [place] was needed at discharge. SW asked PT if pt needed HH at discharge and PT stated pt is at her baseline and [place] not necessary. DIONICIO informed NPElis of PT report and she agreed. Floor nurse informed of the transport time. Follow up appt made and entered on pt AVS.  2nd IM on pt bedside chart. Care Management Interventions  PCP Verified by CM:  Yes  Mode of Transport at Discharge: BLS (Pt to transport at by [facility] today )  Physical Therapy Consult: Yes  Occupational Therapy Consult: Yes  Current Support Network: Relative's Home  Confirm Follow Up Transport: Family  Plan discussed with Pt/Family/Caregiver: Yes  Freedom of Choice Offered: Yes  Discharge Location  Discharge Placement: Home    FRANCIA Jerome  Ext [phone] Other

## 2017-08-01 NOTE — PROGRESS NOTE BEHAVIORAL HEALTH - NSBHCHARTREVIEWVS_PSY_A_CORE FT
Vital Signs Last 24 Hrs  T(C): 36.4 (01 Aug 2017 07:56), Max: 36.4 (01 Aug 2017 07:56)  T(F): 97.5 (01 Aug 2017 07:56), Max: 97.5 (01 Aug 2017 07:56)  HR: 74 (01 Aug 2017 07:56) (74 - 74)  BP: 136/94 (01 Aug 2017 07:56) (136/94 - 136/94)  BP(mean): --  RR: 16 (01 Aug 2017 07:56) (16 - 16)  SpO2: 100% (01 Aug 2017 07:56) (100% - 100%)

## 2017-08-01 NOTE — PROGRESS NOTE BEHAVIORAL HEALTH - NSBHFUPINTERVALHXFT_PSY_A_CORE
Pt seen. Boisterous and effusive but more in control of his behavior . Pt less intrusive and better able to respect peer and staff boundaries. Pt with good eye contact and without previously noted irritable edge. Pt is continuing to  tolerate his now  increased dose of Haldol  to  5 mg po q am and 10 mg po qhs  to alleviate symptoms of  schizoaffective d/o -bipolar type psychosis. . Pt now received  Haldol decanoate 100 mg IM on  8/1/17 with second part of Haldol 50 mg IM to be given in 1 week, with q  monthly Haldol decanoate 150 mg IM thereafter to alleviate ongoing pt schizoaffective d/o- bipolar type psychosis. . Pt tolerating increased Depakote dose as well to further alleviate ongoing and now improving affective instability. Pt is attending therapy groups and better able to not monopolize the discussion  but rather to yield to other peers.   Lab work planned for 8/2/17 to include recheck of Depakote level after recent increase in dose due to pt's ongoing   though improving evidence of hypomania  Pt continues with impaired judgment and limited insight into the nature and severity of his illness. Pt is now more willing to work with inpatient and outpatient treatment teams in an effort to maintain mood stability. Pt continues with good sleep and appetite. He remains in improved behavioral control and pt continues to deny SI /HI /AH /VH.

## 2017-08-01 NOTE — PROGRESS NOTE BEHAVIORAL HEALTH - NSBHFUPINTERVALCCFT_PSY_A_CORE
' I'll go to [facility] but I don't want to stay more than 2 weeks because I have a lot of things to do. A lot of things and I'm in here so everything is frozen."       Labs rechecked  on 7/27/17  Depakote level, CBC and CMP    CBC, CMP WNL  Platelets = 269 K  Depakote level = 85 ug/ml  Pt with normal TSH and vital signs remain largely stable.

## 2017-08-01 NOTE — PROGRESS NOTE BEHAVIORAL HEALTH - OTHER
more genuinely cooperative though pt insight remains limited pt with longstanding low frustration tolerance but with increased impulse control rapid at times but no longer pressured and  becoming more goal-directed " I feel great. Just ask anybody. Didin't they tell you how I am?" variable but better modulated overall becoming more euthymic with resolving hypomania less labile with affect more normal in range and intensity thought process becoming more logical

## 2017-08-02 LAB — VALPROATE SERPL-MCNC: 92 UG/ML — SIGNIFICANT CHANGE UP (ref 50–100)

## 2017-08-02 RX ORDER — HALOPERIDOL DECANOATE 100 MG/ML
50 INJECTION INTRAMUSCULAR ONCE
Qty: 0 | Refills: 0 | Status: COMPLETED | OUTPATIENT
Start: 2017-08-04 | End: 2017-08-04

## 2017-08-02 RX ADMIN — DIVALPROEX SODIUM 500 MILLIGRAM(S): 500 TABLET, DELAYED RELEASE ORAL at 09:36

## 2017-08-02 RX ADMIN — Medication 2 MILLIGRAM(S): at 21:02

## 2017-08-02 RX ADMIN — HALOPERIDOL DECANOATE 5 MILLIGRAM(S): 100 INJECTION INTRAMUSCULAR at 09:36

## 2017-08-02 RX ADMIN — HALOPERIDOL DECANOATE 10 MILLIGRAM(S): 100 INJECTION INTRAMUSCULAR at 21:02

## 2017-08-02 RX ADMIN — DIVALPROEX SODIUM 750 MILLIGRAM(S): 500 TABLET, DELAYED RELEASE ORAL at 21:02

## 2017-08-02 RX ADMIN — Medication 2 MILLIGRAM(S): at 09:36

## 2017-08-02 NOTE — PROGRESS NOTE BEHAVIORAL HEALTH - PROBLEM SELECTOR PLAN 3
1 DC .Abilify  after last dose of Abilify Maintenna 400 mg IM given on 7/19/17.( due to less overall clinical efficacy)   2. To continue  restarted to  Haldol 5 mg po q am and 10 mg po q hs   with plan for Haldol decanoate  100 mg IM to be given on 8/1/17.as pt reportedly did best clinically with well tolerated Haldol.  3.Pt gave verbal consent for writer to contact pt's mother and pt outpatient treatment team to gather further clinical history to aid in pt treatment and disposition planning  4.Plan for rechecked   Depakote level, ( dose now increased to 500 mg po q  am and 750 mg po qhs )  CBC and CMP on 8/2//17   5. Pt gave writer verbal consent to contact pt's aunt Ms Guadalupe ( phone call 0n 7/25/17) for further pt clinical history to aid in diagnosis and in disposition planning.)( contacted on 7/25/17 and again on 7/31/17)  6.Encourage pt to attend therapy groups  as tolerated  7. Plan to give second part of Haldol decanoate 50 mg IM on 8/4/17 prior to pt DC home. recommend pt continue Haldol decanoate q monthly for ongoing alleviation of pt's severe bipolar d/o in the context of  pt h/o entrenched treatment noncompliance  8.Pt amenable to attend a partial hospital program x 2 weeks prior to his return to his outpatient clinic in [facility]

## 2017-08-02 NOTE — PROGRESS NOTE BEHAVIORAL HEALTH - OTHER
more genuinely cooperative though pt insight remains limited. Pt able to regroup after brief 'rebuff' from female peer with whom pt was merely greeting with a 'fist bump' pt with longstanding low frustration tolerance but with increased impulse control variable but better modulated overall rapid at times but no longer pressured and  becoming more goal-directed " I'm doing great. I go to all the groups. That girl got upset when I tried to give a fist bump hello. So I walked away from that >" becoming more euthymic with resolving hypomania less labile with affect more normal in range and intensity thought process becoming more logical

## 2017-08-02 NOTE — PROGRESS NOTE BEHAVIORAL HEALTH - NSBHFUPINTERVALCCFT_PSY_A_CORE
" I'm doing great! I'll go to the partial program for a couple of weeks and then I can go back with outpatient clinic."       Labs rechecked  on 7/27/17  Depakote level, CBC and CMP    CBC, CMP WNL  Platelets = 269 K  Depakote level = 85 ug/ml  Pt with normal TSH and vital signs remain largely stable.  Labs ( 8/2/17)  Depakote level= 92 ug/ml

## 2017-08-02 NOTE — PROGRESS NOTE BEHAVIORAL HEALTH - NSBHFUPINTERVALHXFT_PSY_A_CORE
Pt seen. Boisterous and effusive but more in control of his behavior . Pt less intrusive and better able to respect peer and staff boundaries. Pt with good eye contact and without previously noted irritable edge. Pt is continuing to  tolerate his now  increased dose of Haldol  to  5 mg po q am and 10 mg po qhs  to alleviate symptoms of  schizoaffective d/o -bipolar type psychosis. . Pt now received  Haldol decanoate 100 mg IM on  8/1/17 with second part of Haldol 50 mg IM to be given on 8/4/17 prior to pt discharge in order to complete the first dose of Haldol decanoate 150 mg total. Pt amenable to plan and understands that he will continue with q monthly Haldol decanoate 150 mg with gradual tapering of PO Haldol and ultimate DC of PO Haldol.  with q  monthly Haldol decanoate 150 mg IM thereafter to alleviate ongoing pt schizoaffective d/o- bipolar type psychosis. . Pt tolerating increased Depakote dose as well to further alleviate ongoing and now improving affective instability. Pt is attending therapy groups and better able to not monopolize the discussion  but rather to yield to other peers.   Lab work obtained  on 8/2/17 to include rechecked Depakote level  on 8/2/17 after recent increase in dose due to pt's ongoing   though improving evidence of hypomania  Pt continues with impaired judgment and limited insight into the nature and severity of his illness. Pt is now more willing to work with inpatient and outpatient treatment teams in an effort to maintain mood stability. Pt continues with good sleep and appetite. He remains in improved behavioral control and pt continues to deny SI /HI /AH /VH.

## 2017-08-02 NOTE — PROGRESS NOTE BEHAVIORAL HEALTH - NSBHCHARTREVIEWVS_PSY_A_CORE FT
Vital Signs Last 24 Hrs  T(C): 36.7 (02 Aug 2017 07:38), Max: 36.7 (02 Aug 2017 07:38)  T(F): 98.1 (02 Aug 2017 07:38), Max: 98.1 (02 Aug 2017 07:38)  HR: 82 (02 Aug 2017 07:38) (82 - 82)  BP: 126/87 (02 Aug 2017 07:38) (126/87 - 126/87)  BP(mean): --  RR: 16 (02 Aug 2017 07:38) (16 - 16)  SpO2: 100% (02 Aug 2017 07:38) (100% - 100%)

## 2017-08-03 RX ORDER — TRIHEXYPHENIDYL HCL 2 MG
1 TABLET ORAL
Qty: 28 | Refills: 1 | OUTPATIENT
Start: 2017-08-03 | End: 2017-08-30

## 2017-08-03 RX ORDER — HALOPERIDOL DECANOATE 100 MG/ML
1 INJECTION INTRAMUSCULAR
Qty: 14 | Refills: 1 | OUTPATIENT
Start: 2017-08-03 | End: 2017-08-30

## 2017-08-03 RX ORDER — HALOPERIDOL DECANOATE 100 MG/ML
1 INJECTION INTRAMUSCULAR
Qty: 0 | Refills: 0 | COMMUNITY
Start: 2017-08-03

## 2017-08-03 RX ORDER — TRAZODONE HCL 50 MG
2 TABLET ORAL
Qty: 28 | Refills: 1 | OUTPATIENT
Start: 2017-08-03 | End: 2017-08-30

## 2017-08-03 RX ORDER — DIVALPROEX SODIUM 500 MG/1
1 TABLET, DELAYED RELEASE ORAL
Qty: 14 | Refills: 1 | OUTPATIENT
Start: 2017-08-03 | End: 2017-08-30

## 2017-08-03 RX ORDER — DIVALPROEX SODIUM 500 MG/1
3 TABLET, DELAYED RELEASE ORAL
Qty: 42 | Refills: 1 | OUTPATIENT
Start: 2017-08-03 | End: 2017-08-30

## 2017-08-03 RX ADMIN — HALOPERIDOL DECANOATE 10 MILLIGRAM(S): 100 INJECTION INTRAMUSCULAR at 21:16

## 2017-08-03 RX ADMIN — HALOPERIDOL DECANOATE 5 MILLIGRAM(S): 100 INJECTION INTRAMUSCULAR at 09:10

## 2017-08-03 RX ADMIN — Medication 2 MILLIGRAM(S): at 09:12

## 2017-08-03 RX ADMIN — DIVALPROEX SODIUM 750 MILLIGRAM(S): 500 TABLET, DELAYED RELEASE ORAL at 21:14

## 2017-08-03 RX ADMIN — Medication 2 MILLIGRAM(S): at 21:15

## 2017-08-03 RX ADMIN — DIVALPROEX SODIUM 500 MILLIGRAM(S): 500 TABLET, DELAYED RELEASE ORAL at 09:10

## 2017-08-03 NOTE — DISCHARGE NOTE BEHAVIORAL HEALTH - NSBHDCCRISISPROB2FT_PSY_A_CORE
Return of any of the acute symptoms you had prior to coming to the hospital such as inability to sleep, irritability, anger, agitation.

## 2017-08-03 NOTE — DISCHARGE NOTE BEHAVIORAL HEALTH - NSBHDCVIOLFCTRMIT_PSY_A_CORE
Pt has a loving, supportive, extended family.  Pt with a large outpatient network treatment team available for pt outreach

## 2017-08-03 NOTE — DISCHARGE NOTE BEHAVIORAL HEALTH - NSBHDCTHERAPYFT_PSY_A_CORE
individual along with group therapies including safety planning, coping skills, substance use d/o focus groups, pt psychoeducation related to diagnosis and treatment options  relaxation, aroma therapy, pet therapy

## 2017-08-03 NOTE — DISCHARGE NOTE BEHAVIORAL HEALTH - NSBHDCPURPOSE1FT_PSY_A_CORE
[facility] program, Monday through Friday, 9am-2:45pm. Outpatient mental health treatment.

## 2017-08-03 NOTE — DISCHARGE NOTE BEHAVIORAL HEALTH - NSBHDCNOCAREGVRFT_PSY_A_CORE
Upon adm. pt reportedly designated his mother.  However, pt now states he is an adult and did not designate a caregiver. Message was left for mother. Elias also spoke to his mother about discharge.

## 2017-08-03 NOTE — DISCHARGE NOTE BEHAVIORAL HEALTH - NSBHDCDXVALIDYESFT_PSY_A_CORE
Schizoaffective Disorder- bipolar type  Cannabis Use Disorder-severe  Nonadherence with medical treatment

## 2017-08-03 NOTE — DISCHARGE NOTE BEHAVIORAL HEALTH - NSBHDCVIOLFCTROTHERFT_PSY_A_CORE
Key stressors include a lapse in sleep hygiene, pt nonadherence with his clinically effective and well tolerated med regimen , and /or pt resumption of substance use

## 2017-08-03 NOTE — DISCHARGE NOTE BEHAVIORAL HEALTH - CARE PROVIDER_API CALL
tba, tba  Pt to begin Partial Hospital Program at [facility] M-F x 2 weeks for ongoing clinical stabilization  Phone: (   )    -  Fax: (   )    -

## 2017-08-03 NOTE — DISCHARGE NOTE BEHAVIORAL HEALTH - NSBHDCLABSFT_PSY_A_CORE
Regular monitoring of Depakote level, CBC with diff, CMP ,Platelets q 3-4 monthly  Fasting lipids, HgA1C , regular check of vital signs and waist circumference with Haldol
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
## 2017-08-03 NOTE — DISCHARGE NOTE BEHAVIORAL HEALTH - REASON FOR ADMISSION
" I'm fine! I just didn't sleep for 5 days! The problem is my mother who keeps calling the  because I don't sleep! All she has to do is drive me to the hospital"  When writer asked about pt's mother's safety concerns for the pt and for herself in light of the pt's severe current illnees, pt conceded,' Yeah When she tried to bring me to the hospital on her own, I did some stupid things."

## 2017-08-03 NOTE — DISCHARGE NOTE BEHAVIORAL HEALTH - NSBHDCVIOLPROTECTFT_PSY_A_CORE
Pt is intelligent, creative, likeable  Pt with supportive, loving family. Pt reports he is future oriented and motivated to remain healthy and out of hospital

## 2017-08-03 NOTE — DISCHARGE NOTE BEHAVIORAL HEALTH - NSBHDCVIOLSAFETYFT_PSY_A_CORE
1. Hospital staff reviewed with the pt the pt's ability to recontact inpatient hospital staff pending the pt's start at [facility] program.  2.Pt reminded  that he can call treatment team members who are part of pt PHP and subsequent outpatient clinic providers should new safety concerns arise  3.Pt aware that he can seek support from family and if safety concerns arise, pt and / or family can contact 911 to have pt brought to nearest ER for reevaluation   4.Pt and his family are amenable to this treatment and disposition plan.

## 2017-08-03 NOTE — DISCHARGE NOTE BEHAVIORAL HEALTH - MEDICATION SUMMARY - MEDICATIONS TO TAKE
I will START or STAY ON the medications listed below when I get home from the hospital:    divalproex sodium 250 mg oral tablet, extended release  -- 3 tab(s) by mouth once a day (at bedtime)  -- Indication: For Schizoaffective disorder, bipolar type    divalproex sodium 500 mg oral tablet, extended release  -- 1 tab(s) by mouth once a day  -- Indication: For Schizoaffective disorder, bipolar type    traZODone 100 mg oral tablet  -- 2 tab(s) by mouth once a day (at bedtime)  -- Indication: For Schizoaffective disorder, bipolar type/ insomnia    traZODone 100 mg oral tablet  -- 2 tab(s) by mouth once a day (at bedtime)  -- Indication: For DUPLICATE/ DISREGARD    trihexyphenidyl 2 mg oral tablet  -- 1 tab(s) by mouth 2 times a day  -- Indication: For Schizoaffective disorder, bipolar type/  EPS prophylaxis    haloperidol decanoate 50 mg/mL intramuscular solution  -- 1 milliliter(s) intramuscular once  -- Indication: For HALDOL DECANOATE 50 MG im TO BE GIVEN ON 8/4/17 PRIOR TO PT dc FROM HOSPITAL ON 8/4/17    haloperidol decanoate 100 mg/mL intramuscular solution  -- 1 milliliter(s) intramuscular every 4 weeks  -- Indication: For Schizoaffective disorder, bipolar type( FIRST DOSE RECEIVED ON 8/1/17) PT TO RECEIVE NEXT HALDOL DECANOATE 150 MG IM IN 1 MONTH = 9/4/17    haloperidol 5 mg oral tablet  -- 1 tab(s) by mouth once a day  -- Indication: For Schizoaffective disorder, bipolar type    haloperidol 10 mg oral tablet  -- 1 tab(s) by mouth once a day (at bedtime)  -- Indication: For Schizoaffective disorder, bipolar type

## 2017-08-03 NOTE — DISCHARGE NOTE BEHAVIORAL HEALTH - PROVIDER TOKENS
FREE:[LAST:[tba],FIRST:[tba],PHONE:[(   )    -],FAX:[(   )    -],ADDRESS:[Pt to begin Partial Hospital Program at [facility] M-F x 2 weeks for ongoing clinical stabilization]]

## 2017-08-03 NOTE — PROGRESS NOTE BEHAVIORAL HEALTH - NSBHFUPINTERVALCCFT_PSY_A_CORE
" I'm doing real good. I'm glad I'll be leaving tomorrow."        Labs rechecked  on 7/27/17  Depakote level, CBC and CMP    CBC, CMP WNL  Platelets = 269 K  Depakote level = 85 ug/ml  Pt with normal TSH and vital signs remain largely stable.  Labs ( 8/2/17)  Depakote level= 92 ug/ml

## 2017-08-03 NOTE — DISCHARGE NOTE BEHAVIORAL HEALTH - NSBHDCSWCOMMENTSFT_PSY_A_CORE
Mr. Zhang was educated about the need to stay in treatment and on medication and about his discharge plan above.

## 2017-08-03 NOTE — DISCHARGE NOTE BEHAVIORAL HEALTH - MEDICATION SUMMARY - MEDICATIONS TO STOP TAKING
I will STOP taking the medications listed below when I get home from the hospital:    benztropine 1 mg oral tablet  -- 1 tab(s) by mouth 2 times a day  -- It is very important that you take or use this exactly as directed.  Do not skip doses or discontinue unless directed by your doctor.  May cause drowsiness.  Alcohol may intensify this effect.  Use care when operating dangerous machinery.  Obtain medical advice before taking any non-prescription drugs as some may affect the action of this medication.

## 2017-08-03 NOTE — DISCHARGE NOTE BEHAVIORAL HEALTH - NSBHDCHOUSING_PSY_A_CORE
[id number]  [place] [address]  ([phone])/home [address] [facility] [place] [address]  (([phone])/home

## 2017-08-03 NOTE — DISCHARGE NOTE BEHAVIORAL HEALTH - NSBHDCCRISISPLAN1FT_PSY_A_CORE
Tell family or a trusted friend, tell staff at your program, go to the nearest emergency room.  You may call 911 for assistance.

## 2017-08-03 NOTE — DISCHARGE NOTE BEHAVIORAL HEALTH - NSBHDCMEDICALFT_PSY_A_CORE
none  Pt seen by Hospitalist for reported h/o pt HTN and hypothyroidism  Pt's vital signs remained largely WNL and TSH rechecked also WNL. No further treatment indicated at this time

## 2017-08-03 NOTE — PROGRESS NOTE BEHAVIORAL HEALTH - NSBHCHARTREVIEWVS_PSY_A_CORE FT
Vital Signs Last 24 Hrs  T(C): 36.9 (03 Aug 2017 08:28), Max: 36.9 (03 Aug 2017 08:28)  T(F): 98.4 (03 Aug 2017 08:28), Max: 98.4 (03 Aug 2017 08:28)  HR: 84 (03 Aug 2017 08:28) (84 - 84)  BP: 139/89 (03 Aug 2017 08:28) (139/89 - 139/89)  BP(mean): --  RR: 14 (03 Aug 2017 08:28) (14 - 14)  SpO2: 100% (03 Aug 2017 08:28) (100% - 100%)

## 2017-08-03 NOTE — DISCHARGE NOTE BEHAVIORAL HEALTH - NSBHDCVIOLFCTRSFT_PSY_A_CORE
when pt is medication adherent and not using substances, the pt is in his best emotional and behavioral control

## 2017-08-03 NOTE — DISCHARGE NOTE BEHAVIORAL HEALTH - HPI (INCLUDE ILLNESS QUALITY, SEVERITY, DURATION, TIMING, CONTEXT, MODIFYING FACTORS, ASSOCIATED SIGNS AND SYMPTOMS)
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 PPH includes Bipolar 1 Disorder & Cannabis abuse, past dx of bipolar disorder vs schizoaffective disorder. He has a hx of non medication compliance. He has a hx of multiple inpatient hospitalizations- recently discharged from [facility] on 7/20/17 where he was admitted with a possible manic episode in context of medication non compliance He has a hx of multiple SA last 5-6 years ago in . He has a hx of aggression/violence when manic and a hx of arrest two years ago for disorderly conduct and 3 years ago for possession of MJ. PMH includes obesity, hypothyroidism & HTN. BIB self with insomnia and complaining of "racing thoughts". 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 PPH includes Bipolar 1 Disorder & Cannabis abuse, past dx of bipolar disorder vs schizoaffective disorder. He has a hx of non medication compliance. He has a hx of multiple inpatient hospitalizations- recently discharged from [facility] on 7/20/17 where he was admitted with a possible manic episode in context of medication non compliance He has a hx of multiple SA last 5-6 years ago in [place]. He has a hx of aggression/violence when manic and a hx of arrest two years ago for disorderly conduct and 3 years ago for possession of MJ. PMH includes obesity, hypothyroidism & HTN. BIB self with insomnia and complaining of "racing thoughts".    ·                                                 Course of Hospitalization ( 7/23/17-8/4/17)     Labs rechecked  on 7/27/17  Depakote level, CBC and CMP    CBC, CMP WNL  Platelets = 269 K  Depakote level = 85 ug/ml  Pt with normal TSH and vital signs remain largely stable.  Labs ( 8/2/17)  Depakote level= 92 ug/ml  · Interval History: Pt seen prior to discharge from hospital. . Boisterous and effusive but more in control of his behavior . Pt less intrusive and better able to respect peer and staff boundaries. Pt with good eye contact and without previously noted irritable edge. Pt is continuing to  tolerate his now  increased dose of Haldol  to  5 mg po q am and 10 mg po qhs  to alleviate symptoms of  schizoaffective d/o -bipolar type psychosis. . Pt now received  Haldol decanoate 100 mg IM on  8/1/17 with second part of Haldol 50 mg IM to be given on 8/4/17 prior to pt discharge in order to complete the first dose of Haldol decanoate 150 mg total. Pt amenable to plan and understands that he will continue with q monthly Haldol decanoate 150 mg with gradual tapering of PO Haldol and ultimate DC of PO Haldol.  with q  monthly Haldol decanoate 150 mg IM thereafter to alleviate ongoing pt schizoaffective d/o- bipolar type psychosis. . Pt tolerating increased Depakote dose as well to further alleviate ongoing and now improving affective instability. Pt is attending therapy groups and better able to not monopolize the discussion  but rather to yield to other peers.   Lab work obtained  on 8/2/17 to include rechecked Depakote level  on 8/2/17 after recent increase in dose due to pt's ongoing   though improving evidence of hypomania  Pt continues with impaired judgment and limited insight into the nature and severity of his illness. Pt is now more willing to work with inpatient and outpatient treatment teams in an effort to maintain mood stability. Pt continues with good sleep and appetite. He remains in improved behavioral control and pt continues to deny SI /HI /AH /VH. Pt amenable to discharge planning.

## 2017-08-03 NOTE — PROGRESS NOTE BEHAVIORAL HEALTH - OTHER
pt continuing to develop a better sense of proper boundaries with staff and peers and to not take personally other patient's variable response s to the pt's well intentioned greetings and friendliness. pt with longstanding l but now slowly improving ow frustration tolerance with increased impulse control variable but better modulated overall rapid at times but no longer pressured and  becoming more goal-directed " I'm feeling great." becoming more euthymic with resolving hypomania less labile with affect more normal in range and intensity thought process becoming more logical

## 2017-08-03 NOTE — PROGRESS NOTE BEHAVIORAL HEALTH - NSBHFUPINTERVALHXFT_PSY_A_CORE
Pt seen in his room. Pt less effusive and appearing in better overall behavioral control  Pt's sleep and appetite remain good.  Pt is continuing to  tolerate his now  increased dose of Haldol  to  5 mg po q am and 10 mg po qhs  to alleviate symptoms of  schizoaffective d/o -bipolar type psychosis. . Pt now received  Haldol decanoate 100 mg IM on  8/1/17 with second part of Haldol 50 mg IM to be given on 8/4/17 prior to pt discharge in order to complete the first dose of Haldol decanoate 150 mg total. Pt amenable to plan and understands that he will continue with q monthly Haldol decanoate 150 mg with gradual tapering of PO Haldol and ultimate DC of PO Haldol.  with q  monthly Haldol decanoate 150 mg IM thereafter to alleviate ongoing pt schizoaffective d/o- bipolar type psychosis. . Pt tolerating increased Depakote dose as well to further alleviate ongoing and now improving affective instability. Pt with better eye contact and appropriately pleasant and cooperative . Pt continues to   attend therapy groups and is  better able  to 'yield the floor '  to other peers and staff during discussion groups.  Lab work obtained  on 8/2/17  reviewed with the pt  after recent increase in  Depakote dose due to pt's ongoing   though improving evidence of hypomania  Pt continues with impaired judgment and limited insight into the nature and severity of his illness. Pt is now more willing to work with inpatient and outpatient treatment teams in an effort to maintain mood stability.  The pt  remains in improved behavioral control and pt continues to deny SI /HI /AH /VH. The pt now concedes that his current medication regimen appears to have been helpful after all in alleviating the pt's overall symptoms of schizoaffective d/o - bipolar type.

## 2017-08-03 NOTE — PROGRESS NOTE BEHAVIORAL HEALTH - PROBLEM SELECTOR PLAN 3
1 DC .Abilify  after last dose of Abilify Maintenna 400 mg IM given on 7/19/17.( due to less overall clinical efficacy)   2. To continue  restarted to  Haldol 5 mg po q am and 10 mg po q hs   with plan for Haldol decanoate  100 mg IM to be given on 8/1/17.as pt reportedly did best clinically with well tolerated Haldol.  3.Pt gave verbal consent for writer to contact pt's mother and pt outpatient treatment team to gather further clinical history to aid in pt treatment and disposition planning  4.Plan for rechecked   Depakote level, ( dose now increased to 500 mg po q  am and 750 mg po qhs )  CBC and CMP on 8/2//17   5. Pt gave writer verbal consent to contact pt's aunt Ms Guadalupe ( phone call 0n 7/25/17) for further pt clinical history to aid in diagnosis and in disposition planning.)( contacted on 7/25/17 and again on 7/31/17)  6.Encourage pt to attend therapy groups  as tolerated  7. Plan to give second part of Haldol decanoate 50 mg IM on 8/4/17 prior to pt DC home. recommend pt continue Haldol decanoate q monthly for ongoing alleviation of pt's severe bipolar d/o in the context of  pt h/o entrenched treatment noncompliance  8.Pt amenable to attend a partial hospital program x 2 weeks prior to his return to his outpatient clinic in [facility]

## 2017-08-04 VITALS
TEMPERATURE: 98 F | DIASTOLIC BLOOD PRESSURE: 90 MMHG | OXYGEN SATURATION: 100 % | SYSTOLIC BLOOD PRESSURE: 150 MMHG | HEART RATE: 78 BPM | RESPIRATION RATE: 16 BRPM

## 2017-08-04 RX ADMIN — HALOPERIDOL DECANOATE 50 MILLIGRAM(S): 100 INJECTION INTRAMUSCULAR at 09:40

## 2017-08-04 RX ADMIN — DIVALPROEX SODIUM 500 MILLIGRAM(S): 500 TABLET, DELAYED RELEASE ORAL at 09:40

## 2017-08-04 RX ADMIN — HALOPERIDOL DECANOATE 5 MILLIGRAM(S): 100 INJECTION INTRAMUSCULAR at 09:40

## 2017-08-04 RX ADMIN — Medication 2 MILLIGRAM(S): at 09:40

## 2017-08-04 NOTE — PROGRESS NOTE BEHAVIORAL HEALTH - PROBLEM SELECTOR PROBLEM 2
HTN (hypertension)

## 2017-08-04 NOTE — PROGRESS NOTE BEHAVIORAL HEALTH - PERCEPTIONS
No abnormalities

## 2017-08-04 NOTE — PROGRESS NOTE BEHAVIORAL HEALTH - NSBHFUPSUICINTERVALFT_PSY_A_CORE
approx 8 SA, by adequate means, last age 18, including poisoning and OD lithium.
approximately  8 SA, by adequate means, last age 18, including poisoning and OD lithium.

## 2017-08-04 NOTE — PROGRESS NOTE BEHAVIORAL HEALTH - NSBHCHARTREVIEWLAB_PSY_A_CORE FT
see [place]
CBC Full  -  ( 24 Jul 2017 06:37 )  WBC Count : 8.4 K/uL  Hemoglobin : 15.0 g/dL  Hematocrit : 43.4 %  Platelet Count - Automated : 240 K/uL  Mean Cell Volume : 89.0 fl  Mean Cell Hemoglobin : 30.8 pg  Mean Cell Hemoglobin Concentration : 34.5 gm/dL  Auto Neutrophil # : x  Auto Lymphocyte # : x  Auto Monocyte # : x  Auto Eosinophil # : x  Auto Basophil # : x  Auto Neutrophil % : x  Auto Lymphocyte % : x  Auto Monocyte % : x  Auto Eosinophil % : x  Auto Basophil % : x    07-24    140  |  105  |  13  ----------------------------<  89  4.1   |  28  |  1.08    Ca    9.2      24 Jul 2017 06:37        Depakote level= 54 ug/ml ( 7/23/17) on Depakote  mg po q day
CBC Full  -  ( 27 Jul 2017 06:59 )  WBC Count : 6.2 K/uL  Hemoglobin : 15.2 g/dL  Hematocrit : 48.6 %  Platelet Count - Automated : 269 K/uL  Mean Cell Volume : 89.6 fl  Mean Cell Hemoglobin : 28.1 pg  Mean Cell Hemoglobin Concentration : 31.3 gm/dL  Auto Neutrophil # : 2.9 K/uL  Auto Lymphocyte # : 2.4 K/uL  Auto Monocyte # : 0.7 K/uL  Auto Eosinophil # : 0.1 K/uL  Auto Basophil # : 0.1 K/uL  Auto Neutrophil % : 46.5 %  Auto Lymphocyte % : 39.3 %  Auto Monocyte % : 10.7 %  Auto Eosinophil % : 1.4 %  Auto Basophil % : 2.0 %    07-27    140  |  105  |  13  ----------------------------<  85  4.4   |  31  |  1.03    Ca    9.4      27 Jul 2017 06:59    TPro  6.2  /  Alb  3.7  /  TBili  1.1  /  DBili  x   /  AST  21  /  ALT  28  /  AlkPhos  47  07-27
Depakote level= 92 ug/ml ( 8/2/17)

## 2017-08-04 NOTE — PROGRESS NOTE BEHAVIORAL HEALTH - RISK ASSESSMENT
Pt is high risk of injury to self or others when not regulated by psychopharmacology.  Pt has h/o 8 SA by adequate means and h/o threatening behavior to others but denies h/o harm to others or destruction of property.  Pt feels empowered by choosing psych eval on own volition rather than by external forces.  Pt is reportedly compliant with meds and out Pt psych appointments.

## 2017-08-04 NOTE — PROGRESS NOTE BEHAVIORAL HEALTH - NSBHADMITIPBHPROVIDER_PSY_A_CORE
unknown/no
unknown/no
no/unknown
no/unknown
unknown/no
no/unknown
unk/no
unknown/no

## 2017-08-04 NOTE — PROGRESS NOTE BEHAVIORAL HEALTH - BODY HABITUS
Overweight

## 2017-08-04 NOTE — PROGRESS NOTE BEHAVIORAL HEALTH - PROBLEM SELECTOR PLAN 5
1. Pt gave verbal consent for staff to contact pt's mother ( who had voiced safety concerns to ER staff when pt brought in to the ER on 7/23/17.  2.Possible family meeting if pt amenable as part of disposition planning  3.Management alert observation status due to safety concerns

## 2017-08-04 NOTE — PROGRESS NOTE BEHAVIORAL HEALTH - PROBLEM SELECTOR PROBLEM 3
Schizoaffective disorder, bipolar type

## 2017-08-04 NOTE — PROGRESS NOTE BEHAVIORAL HEALTH - NSBHFUPINTERVALHXFT_PSY_A_CORE
Pt seen in his room. Pt less effusive and appearing in better overall behavioral control  Pt's sleep and appetite remain good.  Pt is continuing to  tolerate his now  increased dose of Haldol  to  5 mg po q am and 10 mg po qhs  to alleviate symptoms of  schizoaffective d/o -bipolar type psychosis. . Pt now received  Haldol decanoate 100 mg IM on  8/1/17 with second part of Haldol 50 mg IM  given on 8/4/17 prior to pt discharge in order to complete the first dose of Haldol decanoate 150 mg total. Pt amenable to plan and understands that he will continue with q monthly Haldol decanoate 150 mg with gradual tapering of PO Haldol and ultimate DC of PO Haldol.  with q  monthly Haldol decanoate 150 mg IM thereafter to alleviate ongoing pt schizoaffective d/o- bipolar type psychosis. . Pt tolerating increased Depakote dose as well to further alleviate ongoing and now improving affective instability. Pt with better eye contact and appropriately pleasant and cooperative . Pt continues to   attend therapy groups and is  better able  to 'yield the floor '  to other peers and staff during discussion groups.  Lab work obtained  on 8/2/17  reviewed with the pt  after recent increase in  Depakote dose due to pt's ongoing   though improving evidence of hypomania  Pt continues with impaired judgment and limited insight into the nature and severity of his illness. Pt is now more willing to work with inpatient and outpatient treatment teams in an effort to maintain mood stability.  The pt  remains in improved behavioral control and pt continues to deny SI /HI /AH /VH. The pt now concedes that his current medication regimen appears to have been helpful after all in alleviating the pt's overall symptoms of schizoaffective d/o - bipolar type.

## 2017-08-04 NOTE — PROGRESS NOTE BEHAVIORAL HEALTH - SUMMARY
Pt was transferred from [facility] ED where he was assessed for psych admission due to poor sleep and seferino, after discharged from [place] 2 days prior, and was admitted to  at  on voluntary status.  Pt more insightful than past regarding need for ED eval, rather than forced to come by others.  Due to sever agitation and aggression at [facility] while manic, Pt received IM sedation with Haldol 4, Ativan 2 and Benadryl, and has reportedly slept since until arrival to ED, where he was received in good control and mild to mod seferino.  Pt denies AH but has had in past manic episodes.  Denies SIIP, HIIP and depression.  Depakote was subtherapeutic and was increased to BID and was reportedly initiated at [place] to augment Ability Maintain received July 19 at [place].  Will need to confirm meds with [place] and past provider after release signed by Pt.
Pt was transferred from [facility] ED where he was assessed for psych admission due to poor sleep and seferino, after discharged from [place] 2 days prior, and was admitted to  at  on voluntary status.  Pt more insightful than past regarding need for ED eval, rather than forced to come by others.  Due to sever agitation and aggression at [facility] while manic, Pt received IM sedation with Haldol 4, Ativan 2 and Benadryl, and has reportedly slept since until arrival to ED, where he was received in good control and mild to mod seferino.  Pt denies AH but has had in past manic episodes.  Denies SIIP, HIIP and depression.  Depakote was subtherapeutic and was increased to BID and was reportedly initiated at [place] to augment Ability Maintain received July 19 at [place].  Will need to confirm meds with [place] and past provider after release signed by Pt.
Pt was transferred from [facility] ED where he was assessed for psych admission due to poor sleep and seferino, after discharged from [place] 2 days prior, and was admitted to  at  on voluntary status.  Pt more insightful than past regarding need for ED eval, rather than forced to come by others.  Due to sever agitation and aggression at [facility] while manic, Pt received IM sedation with Haldol 4, Ativan 2 and Benadryl, and has reportedly slept since until arrival to ED, where he was received in good control and mild to mod seferino.  Pt denies AH but has had in past manic episodes.  Denies SIIP, HIIP and depression.  Depakote was subtherapeutic and was increased to BID and was reportedly initiated at [place] to augment Ability Maintain received July 19 at [place].  Will need to confirm meds with [place] and past provider after release signed by Pt.
Pt was transferred from [facility] ED where he was assessed for psych admission due to poor sleep and seferino, after discharged from [place] 2 days prior, and was admitted to  at  on voluntary status.  Pt more insightful than past regarding need for ED eval, rather than forced to come by others.  Due to sever agitation and aggression at [facility] while manic, Pt received IM sedation with Haldol 4, Ativan 2 and Benadryl, and has reportedly slept since until arrival to ED, where he was received in good control and mild to mod seferino.  Pt denies AH but has had in past manic episodes.  Denies SIIP, HIIP and depression.  Depakote was subtherapeutic and was increased to BID and was reportedly initiated at [place] to augment Ability Maintain received July 19 at [place].  Will need to confirm meds with [place] and past provider after release signed by Pt.
Pt was transferred from [facility] ED where he was assessed for psych admission due to poor sleep and seferino, after discharged from [place] 2 days prior, and was admitted to  at  on voluntary status.  Pt more insightful than past regarding need for ED eval, rather than forced to come by others.  Due to sever agitation and aggression at [facility] while manic, Pt received IM sedation with Haldol 4, Ativan 2 and Benadryl, and has reportedly slept since until arrival to ED, where he was received in good control and mild to mod seferino.  Pt denies AH but has had in past manic episodes.  Denies SIIP, HIIP and depression.  Depakote was subtherapeutic and was increased to BID and was reportedly initiated at [place] to augment Ability Maintain received July 19 at [place].  Will need to confirm meds with [place] and past provider after release signed by Pt.
Pt was transferred from [facility] ED where he was assessed for psych admission due to poor sleep and seferino, after discharged from [place] 2 days prior, and was admitted to  at  on voluntary status.  Pt more insightful than past regarding need for ED eval, rather than forced to come by others.  Due to sever agitation and aggression at [facility] while manic, Pt received IM sedation with Haldol 4, Ativan 2 and Benadryl, and has reportedly slept since until arrival to ED, where he was received in good control and mild to mod seferino.  Pt denies AH but has had in past manic episodes.  Denies SIIP, HIIP and depression.  Depakote was subtherapeutic and was increased to BID and was reportedly initiated at [place] to augment Ability Maintain received July 19 at [place].  Will need to confirm meds with [place] and past provider after release signed by Pt.
Pt was transferred from [facility] ED where he was assessed for psych admission due to poor sleep and seferino, after discharged from [place] 2 days prior, and was admitted to  at  on voluntary status.  Pt more insightful than past regarding need for ED eval, rather than forced to come by others.  Due to sever agitation and aggression at [facility] while manic, Pt received IM sedation with Haldol 4, Ativan 2 and Benadryl, and has reportedly slept since until arrival to ED, where he was received in good control and mild to mod seferino.  Pt denies AH but has had in past manic episodes.  Denies SIIP, HIIP and depression.  Depakote was subtherapeutic and was increased to BID and was reportedly initiated at [place] to augment Ability Maintain received July 19 at [place].  Will need to confirm meds with [place] and past provider after release signed by Pt.
Pt was transferred from [facility] ED where he was assessed for psych admission due to poor sleep and seferino, after discharged from [place] 2 days prior, and was admitted to  at  on voluntary status.  Pt more insightful than past regarding need for ED eval, rather than forced to come by others.  Due to sever agitation and aggression at [facility] while manic, Pt received IM sedation with Haldol 4, Ativan 2 and Benadryl, and has reportedly slept since until arrival to ED, where he was received in good control and mild to mod seferino.  Pt denies AH but has had in past manic episodes.  Denies SIIP, HIIP and depression.  Depakote was subtherapeutic and was increased to BID and was reportedly initiated at [place] to augment Ability Maintain received July 19 at [place].  Will need to confirm meds with [place] and past provider after release signed by Pt.
Pt was transferred from [facility] ED where he was assessed for psych admission due to poor sleep and seferino, after discharged from [place] 2 days prior, and was admitted to  at  on voluntary status.  Pt more insightful than past regarding need for ED eval, rather than forced to come by others.  Due to sever agitation and aggression at [facility] while manic, Pt received IM sedation with Haldol 4, Ativan 2 and Benadryl, and has reportedly slept since until arrival to ED, where he was received in good control and mild to mod seferino.  Pt denies AH but has had in past manic episodes.  Denies SIIP, HIIP and depression.  Depakote was subtherapeutic and was increased to BID and was reportedly initiated at [place] to augment Ability Maintain received July 19 at [place].  Will need to confirm meds with [place] and past provider after release signed by Pt.
Pt was transferred from [facility] ED where he was assessed for psych admission due to poor sleep and seferino, after discharged from [place] 2 days prior, and was admitted to  at  on voluntary status.  Pt more insightful than past regarding need for ED eval, rather than forced to come by others.  Due to sever agitation and aggression at [facility] while manic, Pt received IM sedation with Haldol 4, Ativan 2 and Benadryl, and has reportedly slept since until arrival to ED, where he was received in good control and mild to mod seferino.  Pt denies AH but has had in past manic episodes.  Denies SIIP, HIIP and depression.  Depakote was subtherapeutic and was increased to BID and was reportedly initiated at [place] to augment Ability Maintain received July 19 at [place].  Will need to confirm meds with [place] and past provider after release signed by Pt.
Pt was transferred from [facility] ED where he was assessed for psych admission due to poor sleep and seferino, after discharged from [place] 2 days prior, and was admitted to  at  on voluntary status.  Pt more insightful than past regarding need for ED eval, rather than forced to come by others.  Due to sever agitation and aggression at [facility] while manic, Pt received IM sedation with Haldol 4, Ativan 2 and Benadryl, and has reportedly slept since until arrival to ED, where he was received in good control and mild to mod seferino.  Pt denies AH but has had in past manic episodes.  Denies SIIP, HIIP and depression.  Depakote was subtherapeutic and was increased to BID and was reportedly initiated at [place] to augment Ability Maintain received July 19 at [place].  Will need to confirm meds with [place] and past provider after release signed by Pt.

## 2017-08-04 NOTE — PROGRESS NOTE BEHAVIORAL HEALTH - GAIT / STATION
Normal gait / station

## 2017-08-04 NOTE — PROGRESS NOTE BEHAVIORAL HEALTH - NSBHCHARTREVIEWIMAGING_PSY_A_CORE FT
MEDICATIONS  (STANDING):  diVALproex  milliGRAM(s) Oral every 12 hours  haloperidol     Tablet 5 milliGRAM(s) Oral at bedtime  haloperidol     Tablet 2 milliGRAM(s) Oral two times a day    MEDICATIONS  (PRN):  diphenhydrAMINE   Injectable 50 milliGRAM(s) IntraMuscular once PRN Agitation  LORazepam   Injectable 2 milliGRAM(s) IntraMuscular once PRN Agitation  haloperidol    Injectable 5 milliGRAM(s) IntraMuscular once PRN Agitation  docusate sodium 100 milliGRAM(s) Oral daily PRN Constipation  aluminum hydroxide/magnesium hydroxide/simethicone Suspension 30 milliLiter(s) Oral every 6 hours PRN Dyspepsia  magnesium hydroxide Suspension 30 milliLiter(s) Oral daily PRN Constipation
MEDICATIONS  (STANDING):  diVALproex  milliGRAM(s) Oral every 12 hours  haloperidol     Tablet 5 milliGRAM(s) Oral two times a day  trihexyphenidyl 1 milliGRAM(s) Oral two times a day    MEDICATIONS  (PRN):  diphenhydrAMINE   Injectable 50 milliGRAM(s) IntraMuscular once PRN Agitation  LORazepam   Injectable 2 milliGRAM(s) IntraMuscular once PRN Agitation  haloperidol    Injectable 5 milliGRAM(s) IntraMuscular once PRN Agitation  docusate sodium 100 milliGRAM(s) Oral daily PRN Constipation  aluminum hydroxide/magnesium hydroxide/simethicone Suspension 30 milliLiter(s) Oral every 6 hours PRN Dyspepsia  magnesium hydroxide Suspension 30 milliLiter(s) Oral daily PRN Constipation
MEDICATIONS  (STANDING):  diVALproex  milliGRAM(s) Oral every 12 hours  trihexyphenidyl 1 milliGRAM(s) Oral two times a day  haloperidol     Tablet 10 milliGRAM(s) Oral at bedtime  haldol 5 mg po qam     MEDICATIONS  (PRN):  diphenhydrAMINE   Injectable 50 milliGRAM(s) IntraMuscular once PRN Agitation  LORazepam   Injectable 2 milliGRAM(s) IntraMuscular once PRN Agitation  haloperidol    Injectable 5 milliGRAM(s) IntraMuscular once PRN Agitation  docusate sodium 100 milliGRAM(s) Oral daily PRN Constipation  aluminum hydroxide/magnesium hydroxide/simethicone Suspension 30 milliLiter(s) Oral every 6 hours PRN Dyspepsia  magnesium hydroxide Suspension 30 milliLiter(s) Oral daily PRN Constipation
MEDICATIONS  (STANDING):  haloperidol     Tablet 10 milliGRAM(s) Oral at bedtime  haloperidol     Tablet 5 milliGRAM(s) Oral daily  diVALproex  milliGRAM(s) Oral at bedtime  diVALproex  milliGRAM(s) Oral daily  trihexyphenidyl 2 milliGRAM(s) Oral two times a day    MEDICATIONS  (PRN):  diphenhydrAMINE   Injectable 50 milliGRAM(s) IntraMuscular once PRN Agitation  haloperidol    Injectable 5 milliGRAM(s) IntraMuscular once PRN Agitation  docusate sodium 100 milliGRAM(s) Oral daily PRN Constipation  aluminum hydroxide/magnesium hydroxide/simethicone Suspension 30 milliLiter(s) Oral every 6 hours PRN Dyspepsia  magnesium hydroxide Suspension 30 milliLiter(s) Oral daily PRN Constipation
MEDICATIONS  (STANDING):  haloperidol     Tablet 10 milliGRAM(s) Oral at bedtime  haloperidol     Tablet 5 milliGRAM(s) Oral daily  diVALproex  milliGRAM(s) Oral at bedtime  diVALproex  milliGRAM(s) Oral daily  trihexyphenidyl 2 milliGRAM(s) Oral two times a day  haloperidol decanoate Injectable, Long Acting 100 milliGRAM(s) IntraMuscular every 4 weeks    MEDICATIONS  (PRN):  diphenhydrAMINE   Injectable 50 milliGRAM(s) IntraMuscular once PRN Agitation  haloperidol    Injectable 5 milliGRAM(s) IntraMuscular once PRN Agitation  docusate sodium 100 milliGRAM(s) Oral daily PRN Constipation  aluminum hydroxide/magnesium hydroxide/simethicone Suspension 30 milliLiter(s) Oral every 6 hours PRN Dyspepsia  magnesium hydroxide Suspension 30 milliLiter(s) Oral daily PRN Constipation
MEDICATIONS  (STANDING):  diVALproex  milliGRAM(s) Oral every 12 hours  haloperidol     Tablet 5 milliGRAM(s) Oral at bedtime  haloperidol     Tablet 2 milliGRAM(s) Oral two times a day    MEDICATIONS  (PRN):  diphenhydrAMINE   Injectable 50 milliGRAM(s) IntraMuscular once PRN Agitation  LORazepam   Injectable 2 milliGRAM(s) IntraMuscular once PRN Agitation  haloperidol    Injectable 5 milliGRAM(s) IntraMuscular once PRN Agitation  docusate sodium 100 milliGRAM(s) Oral daily PRN Constipation  aluminum hydroxide/magnesium hydroxide/simethicone Suspension 30 milliLiter(s) Oral every 6 hours PRN Dyspepsia  magnesium hydroxide Suspension 30 milliLiter(s) Oral daily PRN Constipation

## 2017-08-04 NOTE — PROGRESS NOTE BEHAVIORAL HEALTH - NSBHADMITIPDSM_PSY_A_CORE
see above for Axis I, II, III

## 2017-08-04 NOTE — PROGRESS NOTE BEHAVIORAL HEALTH - PROBLEM SELECTOR PLAN 3
1 DC .Abilify  after last dose of Abilify Maintenna 400 mg IM given on 7/19/17.( due to less overall clinical efficacy)   2. To continue  restarted to  Haldol 5 mg po q am and 10 mg po q hs   with plan for Haldol decanoate  100 mg IM given on 8/1/17.as pt reportedly did best clinically with well tolerated Haldol.  3.Pt gave verbal consent for writer to contact pt's mother and pt outpatient treatment team to gather further clinical history to aid in pt treatment and disposition planning  4.Plan for rechecked   Depakote level, ( dose now increased to 500 mg po q  am and 750 mg po qhs )  CBC and CMP on 8/2//17   5. Pt gave writer verbal consent to contact pt's aunt Ms Guadalupe ( phone call 0n 7/25/17) for further pt clinical history to aid in diagnosis and in disposition planning.)( contacted on 7/25/17 and again on 7/31/17)  6.Encourage pt to attend therapy groups  as tolerated  7. Plan to give second part of Haldol decanoate 50 mg IM on 8/4/17 prior to pt DC home. recommend pt continue Haldol decanoate q monthly for ongoing alleviation of pt's severe bipolar d/o in the context of  pt h/o entrenched treatment noncompliance  8.Pt amenable to attend [facility] hospital program x 2 weeks  prior to his return to his outpatient clinic in [facility]

## 2017-08-04 NOTE — PROGRESS NOTE BEHAVIORAL HEALTH - NS ED [facility] MED ROS ENDOCRINE
No complaints

## 2017-08-04 NOTE — PROGRESS NOTE BEHAVIORAL HEALTH - NSBHADMITIPOBS_PSY_A_CORE
Routine observation

## 2017-08-04 NOTE — PROGRESS NOTE BEHAVIORAL HEALTH - NSBHFUPTYPE_PSY_A_CORE
Inpatient

## 2017-08-04 NOTE — PROGRESS NOTE BEHAVIORAL HEALTH - AFFECT QUALITY
Elevated/Anxious/Irritable
Elevated/Irritable/Anxious
Anxious/Elevated/Irritable
Anxious/Irritable/Elevated
Anxious/Irritable/Elevated
Elevated/Anxious/Irritable
Other
Euthymic

## 2017-08-04 NOTE — PROGRESS NOTE BEHAVIORAL HEALTH - NSBHFUPMEDSE_PSY_A_CORE
None known

## 2017-08-04 NOTE — PROGRESS NOTE BEHAVIORAL HEALTH - MUSCLE TONE / STRENGTH
Normal muscle tone/strength

## 2017-08-04 NOTE — PROGRESS NOTE BEHAVIORAL HEALTH - THOUGHT CONTENT
Preoccupations/Ruminations
Preoccupations/Ruminations
Preoccupations
Preoccupations/Ruminations
Ruminations/Preoccupations
Preoccupations
Unremarkable

## 2017-08-04 NOTE — PROGRESS NOTE BEHAVIORAL HEALTH - PROBLEM SELECTOR PLAN 4
1. Ongoing pt staff support and encouragement  2.Pt encouraged to attend therapy groups including those with a focus on coping skills, safety planning and the importance of compliance with treatment in an effort to decrease the risk of clinical relapse

## 2017-08-04 NOTE — PROGRESS NOTE BEHAVIORAL HEALTH - LANGUAGE
No abnormalities noted

## 2017-08-04 NOTE — PROGRESS NOTE BEHAVIORAL HEALTH - ESTIMATED DISCHARGE DATE
04-Aug-2017
04-Aug-2017
02-Aug-2017
04-Aug-2017
28-Jul-2017

## 2017-08-04 NOTE — PROGRESS NOTE BEHAVIORAL HEALTH - NSBHFUPSUICPLAN_PSY_A_CORE
none known

## 2017-08-04 NOTE — PROGRESS NOTE BEHAVIORAL HEALTH - SECONDARY DX3
Hypothyroidism, unspecified type

## 2017-08-04 NOTE — PROGRESS NOTE BEHAVIORAL HEALTH - PRIMARY DX
Bipolar 1 disorder, manic, mild
Schizoaffective disorder, bipolar type

## 2017-08-04 NOTE — PROGRESS NOTE BEHAVIORAL HEALTH - ABNORMAL MOVEMENTS
No abnormal movements
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
## 2017-08-04 NOTE — PROGRESS NOTE BEHAVIORAL HEALTH - NSBHADMITIPREASON_PSY_A_CORE
Danger to others; mental illness expected to respond to inpatient care

## 2017-08-04 NOTE — PROGRESS NOTE BEHAVIORAL HEALTH - NSBHFUPINTERVALCCFT_PSY_A_CORE
" I'm ready to leave. I  feel great!"       Labs rechecked  on 7/27/17  Depakote level, CBC and CMP    CBC, CMP WNL  Platelets = 269 K  Depakote level = 85 ug/ml  Pt with normal TSH and vital signs remain largely stable.  Labs ( 8/2/17)  Depakote level= 92 ug/ml

## 2017-08-04 NOTE — PROGRESS NOTE BEHAVIORAL HEALTH - NS ED [facility] REVIEW OF ED CHART AVAILABLE INVESTIGATIONS REVIEWED
None available

## 2017-08-04 NOTE — PROGRESS NOTE BEHAVIORAL HEALTH - BEHAVIOR
Cooperative/Other
Other/Cooperative
Cooperative

## 2017-08-04 NOTE — PROGRESS NOTE BEHAVIORAL HEALTH - NSBHLEGALSTATUS_PSY_A_CORE
9.13 (Voluntary)

## 2017-08-04 NOTE — PROGRESS NOTE BEHAVIORAL HEALTH - NS ED [facility] MSE GENERAL APPEARANCE
No deformities present/Well developed
Well developed/No deformities present
Well developed/No deformities present
No deformities present/Well developed

## 2017-08-04 NOTE — PROGRESS NOTE BEHAVIORAL HEALTH - PROBLEM SELECTOR PROBLEM 5
Relational problem related to a mental disorder or medical condition

## 2017-08-04 NOTE — PROGRESS NOTE BEHAVIORAL HEALTH - NSBHPTASSESSDT_PSY_A_CORE
23-Jul-2017 13:42
24-Jul-2017
25-Jul-2017
01-Aug-2017
26-Jul-2017
27-Jul-2017
28-Jul-2017
31-Jul-2017
03-Aug-2017
02-Aug-2017
04-Aug-2017

## 2017-08-04 NOTE — PROGRESS NOTE BEHAVIORAL HEALTH - NSBHFUPVIOLFT_PSY_A_CORE
Pt denies severe aggression or past harm to people or destruction of property but has h/o extreme mood swings,  aggressive,  menacing and threatening behavior.

## 2017-08-04 NOTE — PROGRESS NOTE BEHAVIORAL HEALTH - NSBHATTESTSEENBY_PSY_A_CORE
attending Psychiatrist without NP/Trainee
NP with telephonic supervision from Attending Psychiatrist
attending Psychiatrist without NP/Trainee
attending Psychiatrist without NP/Trainee

## 2017-08-04 NOTE — PROGRESS NOTE BEHAVIORAL HEALTH - AXIS III
HTN, Hypothyroid, Obesity

## 2017-08-04 NOTE — PROGRESS NOTE BEHAVIORAL HEALTH - THOUGHT PROCESS
Tangential/Circumstantial/Impaired reasoning/Overinclusive/Illogical
Tangential/Illogical/Impaired reasoning/Overinclusive/Circumstantial
Circumstantial/Illogical/Impaired reasoning/Tangential/Overinclusive
Impaired reasoning/Illogical/Overinclusive/Circumstantial/Tangential
Impaired reasoning/Overinclusive/Illogical/Circumstantial/Tangential
Other/Overinclusive/Circumstantial/Linear
Tangential/Illogical/Impaired reasoning/Overinclusive/Circumstantial
Circumstantial/Linear/Overinclusive/Other
Linear/Circumstantial/Other/Overinclusive
Overinclusive/Linear/Circumstantial/Other
Linear/Normal reasoning

## 2017-08-04 NOTE — PROGRESS NOTE BEHAVIORAL HEALTH - SECONDARY DX1
Nonadherence to medical treatment

## 2017-08-04 NOTE — PROGRESS NOTE BEHAVIORAL HEALTH - AFFECT CONGRUENCE
Congruent
Not congruent
Not congruent
Congruent
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
## 2017-08-04 NOTE — PROGRESS NOTE BEHAVIORAL HEALTH - NSBHCONSORIP_PSY_A_CORE
Inpatient Admission...

## 2017-08-04 NOTE — PROGRESS NOTE BEHAVIORAL HEALTH - PROBLEM SELECTOR PROBLEM 1
Hypothyroidism, unspecified type

## 2017-08-05 DIAGNOSIS — E66.9 OBESITY, UNSPECIFIED: ICD-10-CM

## 2017-08-05 DIAGNOSIS — E03.9 HYPOTHYROIDISM, UNSPECIFIED: ICD-10-CM

## 2017-08-05 DIAGNOSIS — F12.10 CANNABIS ABUSE, UNCOMPLICATED: ICD-10-CM

## 2017-08-05 DIAGNOSIS — F31.9 BIPOLAR DISORDER, UNSPECIFIED: ICD-10-CM

## 2017-08-05 DIAGNOSIS — I10 ESSENTIAL (PRIMARY) HYPERTENSION: ICD-10-CM

## 2017-08-05 DIAGNOSIS — F31.2 BIPOLAR DISORDER, CURRENT EPISODE MANIC SEVERE WITH PSYCHOTIC FEATURES: ICD-10-CM

## 2017-08-05 DIAGNOSIS — G47.00 INSOMNIA, UNSPECIFIED: ICD-10-CM

## 2017-08-05 DIAGNOSIS — Z91.14 PATIENT'S OTHER NONCOMPLIANCE WITH MEDICATION REGIMEN: ICD-10-CM

## 2017-08-05 DIAGNOSIS — Z91.5 PERSONAL HISTORY OF SELF-HARM: ICD-10-CM

## 2017-08-08 ENCOUNTER — APPOINTMENT (OUTPATIENT)
Dept: INTERNAL MEDICINE | Facility: CLINIC | Age: 32
End: 2017-08-08

## 2017-08-31 ENCOUNTER — APPOINTMENT (OUTPATIENT)
Dept: INTERNAL MEDICINE | Facility: CLINIC | Age: 32
End: 2017-08-31
Payer: MEDICARE

## 2017-08-31 ENCOUNTER — NON-APPOINTMENT (OUTPATIENT)
Age: 32
End: 2017-08-31

## 2017-08-31 VITALS
TEMPERATURE: 98.4 F | SYSTOLIC BLOOD PRESSURE: 110 MMHG | WEIGHT: 249 LBS | RESPIRATION RATE: 18 BRPM | DIASTOLIC BLOOD PRESSURE: 79 MMHG | HEART RATE: 98 BPM | HEIGHT: 69 IN | OXYGEN SATURATION: 98 % | BODY MASS INDEX: 36.88 KG/M2

## 2017-08-31 DIAGNOSIS — K59.00 CONSTIPATION, UNSPECIFIED: ICD-10-CM

## 2017-08-31 DIAGNOSIS — T88.7XXA UNSPECIFIED ADVERSE EFFECT OF DRUG OR MEDICAMENT, INITIAL ENCOUNTER: ICD-10-CM

## 2017-08-31 PROCEDURE — 99213 OFFICE O/P EST LOW 20 MIN: CPT

## 2017-08-31 RX ORDER — PSYLLIUM SEED
63 PACKET (EA) ORAL TWICE DAILY
Qty: 1 | Refills: 0 | Status: ACTIVE | COMMUNITY
Start: 2017-08-31 | End: 1900-01-01

## 2017-09-05 LAB
ALBUMIN SERPL ELPH-MCNC: 4.3 G/DL
ALP BLD-CCNC: 45 U/L
ALT SERPL-CCNC: 8 U/L
ANION GAP SERPL CALC-SCNC: 13 MMOL/L
AST SERPL-CCNC: 12 U/L
BASOPHILS # BLD AUTO: 0.02 K/UL
BASOPHILS NFR BLD AUTO: 0.4 %
BILIRUB SERPL-MCNC: 1.2 MG/DL
BUN SERPL-MCNC: 14 MG/DL
CALCIUM SERPL-MCNC: 9.5 MG/DL
CHLORIDE SERPL-SCNC: 103 MMOL/L
CO2 SERPL-SCNC: 25 MMOL/L
CREAT SERPL-MCNC: 1.19 MG/DL
EOSINOPHIL # BLD AUTO: 0.08 K/UL
EOSINOPHIL NFR BLD AUTO: 1.6 %
GLUCOSE SERPL-MCNC: 104 MG/DL
HCT VFR BLD CALC: 42.7 %
HGB BLD-MCNC: 13.8 G/DL
IMM GRANULOCYTES NFR BLD AUTO: 0.2 %
LYMPHOCYTES # BLD AUTO: 2.1 K/UL
LYMPHOCYTES NFR BLD AUTO: 43.1 %
MAN DIFF?: NORMAL
MCHC RBC-ENTMCNC: 29.9 PG
MCHC RBC-ENTMCNC: 32.3 GM/DL
MCV RBC AUTO: 92.6 FL
MONOCYTES # BLD AUTO: 0.54 K/UL
MONOCYTES NFR BLD AUTO: 11.1 %
NEUTROPHILS # BLD AUTO: 2.12 K/UL
NEUTROPHILS NFR BLD AUTO: 43.6 %
PLATELET # BLD AUTO: 243 K/UL
POTASSIUM SERPL-SCNC: 4.6 MMOL/L
PROT SERPL-MCNC: 6.5 G/DL
RBC # BLD: 4.61 M/UL
RBC # FLD: 12.3 %
SODIUM SERPL-SCNC: 141 MMOL/L
TSH SERPL-ACNC: 0.74 UIU/ML
WBC # FLD AUTO: 4.87 K/UL

## 2018-05-31 ENCOUNTER — APPOINTMENT (OUTPATIENT)
Dept: PODIATRY | Facility: CLINIC | Age: 33
End: 2018-05-31

## 2018-05-31 ENCOUNTER — OUTPATIENT (OUTPATIENT)
Dept: OUTPATIENT SERVICES | Facility: HOSPITAL | Age: 33
LOS: 1 days | End: 2018-05-31
Payer: MEDICARE

## 2018-05-31 VITALS
BODY MASS INDEX: 36.88 KG/M2 | RESPIRATION RATE: 18 BRPM | TEMPERATURE: 98.5 F | HEART RATE: 75 BPM | HEIGHT: 69 IN | DIASTOLIC BLOOD PRESSURE: 78 MMHG | OXYGEN SATURATION: 99 % | SYSTOLIC BLOOD PRESSURE: 127 MMHG | WEIGHT: 249 LBS

## 2018-05-31 DIAGNOSIS — Z00.00 ENCOUNTER FOR GENERAL ADULT MEDICAL EXAMINATION WITHOUT ABNORMAL FINDINGS: ICD-10-CM

## 2018-05-31 DIAGNOSIS — L98.8 OTHER SPECIFIED DISORDERS OF THE SKIN AND SUBCUTANEOUS TISSUE: ICD-10-CM

## 2018-05-31 PROCEDURE — G0463: CPT

## 2018-06-01 DIAGNOSIS — B35.3 TINEA PEDIS: ICD-10-CM

## 2018-06-28 ENCOUNTER — OUTPATIENT (OUTPATIENT)
Dept: OUTPATIENT SERVICES | Facility: HOSPITAL | Age: 33
LOS: 1 days | End: 2018-06-28
Payer: MEDICARE

## 2018-06-28 ENCOUNTER — APPOINTMENT (OUTPATIENT)
Dept: PODIATRY | Facility: CLINIC | Age: 33
End: 2018-06-28

## 2018-06-28 VITALS
SYSTOLIC BLOOD PRESSURE: 119 MMHG | HEART RATE: 101 BPM | HEIGHT: 69 IN | WEIGHT: 249 LBS | TEMPERATURE: 98.9 F | BODY MASS INDEX: 36.88 KG/M2 | DIASTOLIC BLOOD PRESSURE: 77 MMHG

## 2018-06-28 DIAGNOSIS — Z00.00 ENCOUNTER FOR GENERAL ADULT MEDICAL EXAMINATION WITHOUT ABNORMAL FINDINGS: ICD-10-CM

## 2018-06-28 PROCEDURE — G0463: CPT

## 2018-06-28 PROCEDURE — 11055 PARING/CUTG B9 HYPRKER LES 1: CPT

## 2018-06-29 DIAGNOSIS — M79.673 PAIN IN UNSPECIFIED FOOT: ICD-10-CM

## 2018-08-02 NOTE — PROGRESS NOTE BEHAVIORAL HEALTH - MOOD
Other/Irritable
Irritable/Other
Anxious/Other
Anxious/Other/Irritable
Irritable/Anxious/Other
Irritable/Other/Anxious
Other/Anxious/Irritable
Normal/Other
Other
Other/Normal
Normal
I will STOP taking the medications listed below when I get home from the hospital:  None

## 2018-10-30 ENCOUNTER — APPOINTMENT (OUTPATIENT)
Dept: PODIATRY | Facility: CLINIC | Age: 33
End: 2018-10-30

## 2019-04-10 PROBLEM — E66.9 OBESITY, UNSPECIFIED: Chronic | Status: ACTIVE | Noted: 2017-07-23

## 2019-04-10 PROBLEM — I10 ESSENTIAL (PRIMARY) HYPERTENSION: Chronic | Status: ACTIVE | Noted: 2017-07-23

## 2019-04-10 PROBLEM — E03.9 HYPOTHYROIDISM, UNSPECIFIED: Chronic | Status: ACTIVE | Noted: 2017-07-23

## 2019-04-16 ENCOUNTER — APPOINTMENT (OUTPATIENT)
Dept: INTERNAL MEDICINE | Facility: CLINIC | Age: 34
End: 2019-04-16

## 2019-04-16 DIAGNOSIS — M54.2 CERVICALGIA: ICD-10-CM

## 2019-04-16 DIAGNOSIS — R09.81 NASAL CONGESTION: ICD-10-CM

## 2019-06-09 PROBLEM — N47.1 PHIMOSIS: Status: ACTIVE | Noted: 2017-05-04

## 2019-07-16 ENCOUNTER — APPOINTMENT (OUTPATIENT)
Dept: INTERNAL MEDICINE | Facility: CLINIC | Age: 34
End: 2019-07-16
Payer: MEDICARE

## 2019-07-16 VITALS
HEART RATE: 84 BPM | DIASTOLIC BLOOD PRESSURE: 89 MMHG | OXYGEN SATURATION: 98 % | WEIGHT: 250 LBS | SYSTOLIC BLOOD PRESSURE: 123 MMHG | BODY MASS INDEX: 37.03 KG/M2 | HEIGHT: 69 IN | RESPIRATION RATE: 18 BRPM | TEMPERATURE: 97.6 F

## 2019-07-16 DIAGNOSIS — M54.6 PAIN IN THORACIC SPINE: ICD-10-CM

## 2019-07-16 DIAGNOSIS — F99 MENTAL DISORDER, NOT OTHERWISE SPECIFIED: ICD-10-CM

## 2019-07-16 PROBLEM — R09.81 NASAL CONGESTION: Status: ACTIVE | Noted: 2019-07-16

## 2019-07-16 PROBLEM — M54.2 NECK PAIN: Status: ACTIVE | Noted: 2019-07-16

## 2019-07-16 PROCEDURE — 99214 OFFICE O/P EST MOD 30 MIN: CPT

## 2019-07-16 RX ORDER — AMLODIPINE BESYLATE 2.5 MG/1
2.5 TABLET ORAL DAILY
Qty: 90 | Refills: 3 | Status: ACTIVE | COMMUNITY
Start: 2019-07-16 | End: 1900-01-01

## 2019-07-16 RX ORDER — QUETIAPINE FUMARATE 300 MG/1
300 TABLET ORAL
Qty: 60 | Refills: 0 | Status: ACTIVE | COMMUNITY
Start: 2019-07-16

## 2019-07-16 RX ORDER — SODIUM CHLORIDE 0.65 %
0.65 AEROSOL, SPRAY (ML) NASAL TWICE DAILY
Qty: 1 | Refills: 2 | Status: ACTIVE | COMMUNITY
Start: 2019-07-16 | End: 1900-01-01

## 2019-07-16 RX ORDER — DIVALPROEX SODIUM 250 MG/1
250 TABLET, DELAYED RELEASE ORAL DAILY
Qty: 150 | Refills: 3 | Status: DISCONTINUED | COMMUNITY
Start: 2017-08-31 | End: 2019-07-16

## 2019-07-16 RX ORDER — TRIHEXYPHENIDYL HYDROCHLORIDE 2 MG/1
2 TABLET ORAL TWICE DAILY
Qty: 60 | Refills: 5 | Status: ACTIVE | COMMUNITY
Start: 2019-07-16

## 2019-07-16 RX ORDER — HALOPERIDOL 5 MG/1
5 TABLET ORAL
Qty: 30 | Refills: 0 | Status: DISCONTINUED | COMMUNITY
Start: 2017-02-08 | End: 2019-07-16

## 2019-07-16 RX ORDER — HALOPERIDOL DECANOATE 100 MG/ML
100 INJECTION INTRAMUSCULAR
Refills: 0 | Status: DISCONTINUED | COMMUNITY
End: 2019-07-16

## 2019-07-16 RX ORDER — BENZTROPINE MESYLATE 1 MG/1
1 TABLET ORAL
Qty: 30 | Refills: 0 | Status: DISCONTINUED | COMMUNITY
Start: 2016-09-28 | End: 2019-07-16

## 2019-07-16 NOTE — ASSESSMENT
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

## 2019-07-16 NOTE — PHYSICAL EXAM
[No Acute Distress] : no acute distress [Well Developed] : well developed [Well-Appearing] : well-appearing [Normal Sclera/Conjunctiva] : normal sclera/conjunctiva [PERRL] : pupils equal round and reactive to light [EOMI] : extraocular movements intact [Normal Outer Ear/Nose] : the outer ears and nose were normal in appearance [Normal Oropharynx] : the oropharynx was normal [No JVD] : no jugular venous distention [No Lymphadenopathy] : no lymphadenopathy [Supple] : supple [Thyroid Normal, No Nodules] : the thyroid was normal and there were no nodules present [No Respiratory Distress] : no respiratory distress  [No Accessory Muscle Use] : no accessory muscle use [Clear to Auscultation] : lungs were clear to auscultation bilaterally [Normal Rate] : normal rate  [Regular Rhythm] : with a regular rhythm [Normal S1, S2] : normal S1 and S2 [No Murmur] : no murmur heard [No Carotid Bruits] : no carotid bruits [No Abdominal Bruit] : a ~M bruit was not heard ~T in the abdomen [No Varicosities] : no varicosities [Pedal Pulses Present] : the pedal pulses are present [No Edema] : there was no peripheral edema [No Palpable Aorta] : no palpable aorta [No Extremity Clubbing/Cyanosis] : no extremity clubbing/cyanosis [Soft] : abdomen soft [No Masses] : no abdominal mass palpated [No HSM] : no HSM [Normal Supraclavicular Nodes] : no supraclavicular lymphadenopathy [Normal Posterior Cervical Nodes] : no posterior cervical lymphadenopathy [Normal Anterior Cervical Nodes] : no anterior cervical lymphadenopathy [No CVA Tenderness] : no CVA  tenderness [No Spinal Tenderness] : no spinal tenderness [Kyphosis] : no kyphosis [Scoliosis] : no scoliosis [No Joint Swelling] : no joint swelling [Grossly Normal Strength/Tone] : grossly normal strength/tone [No Rash] : no rash [Coordination Grossly Intact] : coordination grossly intact [No Focal Deficits] : no focal deficits [Normal Gait] : normal gait [Deep Tendon Reflexes (DTR)] : deep tendon reflexes were 2+ and symmetric [Normal Affect] : the affect was normal [Normal Insight/Judgement] : insight and judgment were intact [de-identified] : obese [de-identified] : gynecomastia,L.axillary l/n [de-identified] : L.axillary l/n [de-identified] : acanthosis.white striae

## 2019-07-16 NOTE — COUNSELING
[Weight management counseling provided] : Weight management [Healthy eating counseling provided] : healthy eating [Activity counseling provided] : activity [Good understanding] : Patient has a good understanding of disease, goals and obesity follow-up plan [Low Fat Diet] : Low fat diet [Walking] : Walking [None] : None

## 2019-07-16 NOTE — HISTORY OF PRESENT ILLNESS
[FreeTextEntry1] : mild seasonal cough,1 week\par neck,thoracic back pain\par h/o mild HTN [de-identified] : 32 yo c/o mild seasonal cough,no fever,1 week\par pt also c/o neck and thoracic back pain related to progressive gynecomastia\par pt is a ,on [facility] for psych Dx,on new set of meds.Haldol and Abilify were d/c-ed\par also h/o mild HTN,was given Amlodipine few years ago,which he finished many weeks ago.

## 2019-07-16 NOTE — HEALTH RISK ASSESSMENT
[] : No [No] : No [No falls in past year] : Patient reported no falls in the past year [0] : 2) Feeling down, depressed, or hopeless: Not at all (0) [[id number]] : 0

## 2019-07-17 LAB
ALBUMIN SERPL ELPH-MCNC: 4.7 G/DL
ALP BLD-CCNC: 59 U/L
ALT SERPL-CCNC: 34 U/L
ANION GAP SERPL CALC-SCNC: 14 MMOL/L
APPEARANCE: CLEAR
AST SERPL-CCNC: 43 U/L
BASOPHILS # BLD AUTO: 0.05 K/UL
BASOPHILS NFR BLD AUTO: 0.9 %
BILIRUB SERPL-MCNC: 0.6 MG/DL
BILIRUBIN URINE: NEGATIVE
BLOOD URINE: NEGATIVE
BUN SERPL-MCNC: 18 MG/DL
CALCIUM SERPL-MCNC: 9.6 MG/DL
CHLORIDE SERPL-SCNC: 108 MMOL/L
CHOLEST SERPL-MCNC: 138 MG/DL
CHOLEST/HDLC SERPL: 3.1 RATIO
CO2 SERPL-SCNC: 21 MMOL/L
COLOR: NORMAL
CREAT SERPL-MCNC: 1.09 MG/DL
EOSINOPHIL # BLD AUTO: 0.07 K/UL
EOSINOPHIL NFR BLD AUTO: 1.2 %
ESTIMATED AVERAGE GLUCOSE: 85 MG/DL
GLUCOSE QUALITATIVE U: NEGATIVE
GLUCOSE SERPL-MCNC: 88 MG/DL
HBA1C MFR BLD HPLC: 4.6 %
HCT VFR BLD CALC: 42.9 %
HDLC SERPL-MCNC: 45 MG/DL
HGB BLD-MCNC: 13.4 G/DL
IMM GRANULOCYTES NFR BLD AUTO: 0.5 %
KETONES URINE: NEGATIVE
LDLC SERPL CALC-MCNC: 80 MG/DL
LEUKOCYTE ESTERASE URINE: NEGATIVE
LYMPHOCYTES # BLD AUTO: 1.89 K/UL
LYMPHOCYTES NFR BLD AUTO: 32.4 %
MAN DIFF?: NORMAL
MCHC RBC-ENTMCNC: 29.8 PG
MCHC RBC-ENTMCNC: 31.2 GM/DL
MCV RBC AUTO: 95.5 FL
MONOCYTES # BLD AUTO: 0.48 K/UL
MONOCYTES NFR BLD AUTO: 8.2 %
NEUTROPHILS # BLD AUTO: 3.32 K/UL
NEUTROPHILS NFR BLD AUTO: 56.8 %
NITRITE URINE: NEGATIVE
PH URINE: 6
PLATELET # BLD AUTO: 300 K/UL
POTASSIUM SERPL-SCNC: 5.1 MMOL/L
PROT SERPL-MCNC: 7 G/DL
PROTEIN URINE: NEGATIVE
RBC # BLD: 4.49 M/UL
RBC # FLD: 11.9 %
SODIUM SERPL-SCNC: 143 MMOL/L
SPECIFIC GRAVITY URINE: 1.02
TRIGL SERPL-MCNC: 64 MG/DL
TSH SERPL-ACNC: 0.43 UIU/ML
UROBILINOGEN URINE: NORMAL
WBC # FLD AUTO: 5.84 K/UL

## 2019-07-18 ENCOUNTER — APPOINTMENT (OUTPATIENT)
Dept: SURGERY | Facility: CLINIC | Age: 34
End: 2019-07-18
Payer: MEDICARE

## 2019-07-18 VITALS
DIASTOLIC BLOOD PRESSURE: 76 MMHG | SYSTOLIC BLOOD PRESSURE: 121 MMHG | BODY MASS INDEX: 37.03 KG/M2 | HEART RATE: 89 BPM | RESPIRATION RATE: 98 BRPM | HEIGHT: 69 IN | TEMPERATURE: 98.4 F | WEIGHT: 250 LBS

## 2019-07-18 PROCEDURE — 99203 OFFICE O/P NEW LOW 30 MIN: CPT

## 2019-07-18 NOTE — CONSULT LETTER
[Dear  ___] : Dear  [unfilled], [Consult Closing:] : Thank you very much for allowing me to participate in the care of this patient.  If you have any questions, please do not hesitate to contact me. [Sincerely,] : Sincerely, [FreeTextEntry3] : Reid Ronquillo MD\par

## 2019-07-18 NOTE — HISTORY OF PRESENT ILLNESS
[de-identified] : C/o bilateral large gynecomastia with pain in the area. He said he was in the  and was started on Restoril and he thinks the gynecomastia started after taking the medication. He experiences pain in the area and in the shoulder

## 2019-07-18 NOTE — PLAN
[FreeTextEntry1] : Discussed the options with the patient. He said he does not want any bad cosmetic outcomes and he enquired about plastic surgery\par Given the loose skin after the excision he will have loose skin \par Discussed the options with him and he wants to see plastic surgery and recommend that he sees DR. Rosen / plastic surgery

## 2019-07-18 NOTE — REASON FOR VISIT
Date of service: 



12/02/2018



PROCEDURE:  CT Chest with contrast (Pulmonary Angiogram)



HISTORY:

r/o PE



COMPARISON:

Correlation made with chest radiograph earlier same day.  Comparison 

also made with prior CT scan abdomen pelvis 11/26/2018 which imaged 

both lung bases in 3 planes.



TECHNIQUE:

Axial computed tomography images were obtained of the chest in the 

pulmonary arterial phase of enhancement. Coronal and sagittal 

reformatted images were created and reviewed.



Intravenous contrast dose: 



Radiation dose:



Total exam DLP = 611.1 mGy-cm.



This CT exam was performed using one or more of the following dose 

reduction techniques: Automated exposure control, adjustment of the 

mA and/or kV according to patient size, and/or use of iterative 

reconstruction technique.. 



FINDINGS:



PULMONARY ARTERIES:

The visualized pulmonary trunk, right and left main, lobar, segmental 

and proximal subsegmental branches of the pulmonary arteries are well 

opacified with no definitive filling defects seen to suggest acute 

pulmonary embolus..  Pulmonary trunk measures approximately the 3.0 

cm.  



Note that the injection was performed through a femoral vein 

triple-lumen catheter which accounts for enhancement of the IVC. 



AORTA:

No acute findings.  Mild dilatation ascending thoracic aorta 

measuring approximately 4.1 cm..  Descending thoracic aorta measures 

approximately 3.0 cm.  Minor aortic atherosclerotic calcification or 

mural plaque present.



LUNGS:

Significant centrilobular and panlobular and bullous emphysematous 

changes are present left-sided which is more severe than the right.  

Areas of scarring and fibrosis are also present..  Apparent 

atelectasis also seen in the posterior the aspect of the upper lobe 

on the left side bordering the fissure.  Chronic left apical pleural 

thickening 



PLEURAL SPACES:

Small bilateral effusions right larger than left.  There appears to 

be some minimal associated bibasilar atelectasis also greater on the 

right. 



HEART:

Heart size is within range of normal.  No significant pericardial 

effusion...  No cardiomegaly. No significant pericardial effusion. 



LYMPH NODES:

Few tiny nonspecific mediastinal lymph nodes.  No significant hilar 

adenopathy.



There is mild shift of the mediastinum from left to right.  In situ 

ETT, the tip of which lies approximately the 5.3 cm above errol.  

There is soft tissue likely representing thickened mucus secretions 

within the distal trachea and extending into the proximal posterior 

margins of the right and left mainstem bronchi left more so than 

right..  



There is a sliding-type hiatal hernia which appeared larger on prior 

CT scan 



BONES, CHEST WALL:

Unremarkable. No fracture or destructive lesion 



OTHER FINDINGS:

Unremarkable. 



IMPRESSION:

No evidence of acute central pulmonary embolus.  Mild dilatation 

ascending thoracic aorta measuring approximately 4.1 cm. 



Extensive centrilobular panlobular and bullous emphysematous changes 

left more severe than the right.  Small bilateral effusions right 

larger than left with mild atelectasis appeared atelectasis also seen 

along the posterior margins of the left upper lobe bordering the 

fissure the. 



In situ ETT.  There is presumed thickened mucus secretions within the 

distal trachea extending into the mainstem bronchi left more so than 

right.



Sliding-type hiatal hernia as above.. [Consultation] : a consultation visit [FreeTextEntry1] : bilateral gynecomastia

## 2019-07-22 LAB — 25(OH)D3 SERPL-MCNC: 29.7 NG/ML

## 2020-01-01 NOTE — ED BEHAVIORAL HEALTH ASSESSMENT NOTE - LANGUAGE
Immunization/s administered 2020 by Kd Montemayor LPN with guardian's consent. Patient tolerated procedure well. No reactions noted. Normal naming/Normal repetition/No abnormalities noted

## 2020-11-25 DIAGNOSIS — E66.9 OBESITY, UNSPECIFIED: ICD-10-CM

## 2020-11-25 DIAGNOSIS — Z00.00 ENCOUNTER FOR GENERAL ADULT MEDICAL EXAMINATION W/OUT ABNORMAL FINDINGS: ICD-10-CM

## 2020-11-25 DIAGNOSIS — I10 ESSENTIAL (PRIMARY) HYPERTENSION: ICD-10-CM

## 2020-11-25 DIAGNOSIS — N62 HYPERTROPHY OF BREAST: ICD-10-CM

## 2021-09-19 ENCOUNTER — EMERGENCY (EMERGENCY)
Facility: HOSPITAL | Age: 36
LOS: 1 days | Discharge: HOME | End: 2021-09-19
Attending: EMERGENCY MEDICINE | Admitting: EMERGENCY MEDICINE
Payer: MEDICARE

## 2021-09-19 VITALS
RESPIRATION RATE: 16 BRPM | HEIGHT: 70 IN | SYSTOLIC BLOOD PRESSURE: 138 MMHG | OXYGEN SATURATION: 99 % | TEMPERATURE: 98 F | DIASTOLIC BLOOD PRESSURE: 77 MMHG | HEART RATE: 77 BPM

## 2021-09-19 PROCEDURE — 99283 EMERGENCY DEPT VISIT LOW MDM: CPT

## 2021-09-19 NOTE — ED PROVIDER NOTE - NSICDXPASTMEDICALHX_GEN_ALL_CORE_FT
PAST MEDICAL HISTORY:  Bipolar disorder     Hypertension     Hypertension     Hypothyroid     Hypothyroidism     Obesity     Obesity     Schizoaffective disorder
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
## 2021-09-19 NOTE — ED PROVIDER NOTE - NSFOLLOWUPINSTRUCTIONS_ED_ALL_ED_FT
You were seen in the Emergency Room for a wound check and evaluated for any life-threatening conditions.     After our evaluation, we have determined you can be discharged to go home.  Please return to the Emergency Room for any signs of a wound infection, such as cloudy drainage, foul smell, or fevers.      Please return to the Emergency room at [facility] or any Emergency Department to have the sutures removed.  The sutures should be removed 10-14days after they were placed.

## 2021-09-19 NOTE — ED PROVIDER NOTE - PATIENT PORTAL LINK FT
You can access the FollowMyHealth Patient Portal offered by [facility] by registering at the following website: [email or web address]. By joining [employer]’s FollowMyHealth portal, you will also be able to view your health information using other applications (apps) compatible with our system.
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
## 2021-09-19 NOTE — ED PROVIDER NOTE - IV ALTEPLASE ADMIN OUTSIDE HIDDEN
Bridgett states the patient physical appointments have been canceled numerous times and would like to speak with a nurse about scheduling. Please call back to discuss. Okay to leave a detailed message.    show
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
## 2021-09-19 NOTE — ED PROVIDER NOTE - PHYSICAL EXAMINATION
General: Patient in no apparent distress, AAO x 3  Skin: left lateral knee laceration scabbing and well healing with sutures in place, no surrounding redness, no discharge, skin otherwise Dry and intact  HEENT: Head atraumatic. Oral mucosa moist.   Eyes: Conjunctiva normal  Cardiac: Regular rhythm and rate. No pretibial edema b/l  Respiratory: Lungs clear b/l and symmetric. No respiratory distress. Able to speak in complete sentences.  Gastrointestinal: Abdomen soft, nondistended, nontender  Musculoskeletal: Moves all extremities spontaneously  Neurological: alert and oriented to person, place, and time  Psychiatric: Calm and cooperative
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
## 2021-10-06 ENCOUNTER — EMERGENCY (EMERGENCY)
Facility: HOSPITAL | Age: 36
LOS: 1 days | Discharge: HOME | End: 2021-10-06
Attending: EMERGENCY MEDICINE | Admitting: EMERGENCY MEDICINE
Payer: COMMERCIAL

## 2021-10-06 VITALS
DIASTOLIC BLOOD PRESSURE: 86 MMHG | HEART RATE: 82 BPM | OXYGEN SATURATION: 100 % | HEIGHT: 70 IN | SYSTOLIC BLOOD PRESSURE: 151 MMHG | TEMPERATURE: 97 F | RESPIRATION RATE: 16 BRPM

## 2021-10-06 VITALS
TEMPERATURE: 98 F | SYSTOLIC BLOOD PRESSURE: 129 MMHG | RESPIRATION RATE: 18 BRPM | HEART RATE: 61 BPM | DIASTOLIC BLOOD PRESSURE: 66 MMHG | OXYGEN SATURATION: 100 %

## 2021-10-06 PROCEDURE — 99053 MED SERV 10PM-8AM 24 HR FAC: CPT

## 2021-10-06 PROCEDURE — 99282 EMERGENCY DEPT VISIT SF MDM: CPT

## 2021-10-06 RX ORDER — DIPHENHYDRAMINE HCL 50 MG
50 CAPSULE ORAL ONCE
Refills: 0 | Status: COMPLETED | OUTPATIENT
Start: 2021-10-06 | End: 2021-10-06

## 2021-10-06 RX ADMIN — Medication 50 MILLIGRAM(S): at 03:49

## 2021-10-06 NOTE — ED PROVIDER NOTE - PATIENT PORTAL LINK FT
You can access the FollowMyHealth Patient Portal offered by [facility] by registering at the following website: [email or web address]. By joining [employer]’s FollowMyHealth portal, you will also be able to view your health information using other applications (apps) compatible with our system.

## 2021-10-06 NOTE — ED ADULT NURSE NOTE - CHIEF COMPLAINT QUOTE
Pt. c/o neck pain for a few hours. Pt. recently discharged from [facility] for psych admission. States he is having side effects from his new medications. Requesting Cogentin. Denies SI/HI. Denies auditory/visual hallucinations.  PMH schizoaffective disorder.

## 2021-10-06 NOTE — ED PROVIDER NOTE - NSFOLLOWUPINSTRUCTIONS_ED_ALL_ED_FT
You are likely suffering from a side effect of haldol called torticollis. Please read the information provided to you regarding this condition.     Benadryl can help these symptoms. Please take your cogentin as prescribed. If symptoms persist take benadryl 50mg once.     Discuss these symptoms with your psychiatrist.     Please return to the Emergency Department should you experience any of the following:  - Chest pain, Palpitations, Painful breathing  - Worsening or persistent shortness of breath  - Fever, unexplained weight loss, night sweats  - Blood in stool or in urine  - New weakness, fatigue, or fainting  - Any new concerning symptoms

## 2021-10-06 NOTE — ED PROVIDER NOTE - CLINICAL SUMMARY MEDICAL DECISION MAKING FREE TEXT BOX
36 yo with psych history presenting with torticollis most likely related to his haldol use.  Will treat with benadryl.  Got depot so could be recurrent.  Will provide teaching as well as ensure he gets cogentin script to help prevent.  No signs of infection or NMS

## 2021-10-06 NOTE — ED ADULT TRIAGE NOTE - CHIEF COMPLAINT QUOTE
Pt. c/o neck pain for a few hours. Pt. recently discharged from [facility] for psych admission. States he is having side effects from his new medications. Requesting Cogentin. Denies SI/HI. Denies auditory/visual hallucinations.  PMH schizoaffective disorder.

## 2021-10-06 NOTE — ED PROVIDER NOTE - PHYSICAL EXAMINATION
Gen: Well appearing in NAD  Head: NC/AT  Neck: trachea midline tightness in the SCM however there is good ROM at this time  Resp:  No distress CTAB  Ext: no deformities  Neuro:  A&O appears non focal  Skin:  Warm and dry as visualized  Psych:  Normal affect and mood  no SI HI AH VH

## 2021-10-06 NOTE — ED PROVIDER NOTE - OBJECTIVE STATEMENT
36 yo with history of bipolar recently dc from OSH for psych reasons where he was given haldol frequently and dc with depo shot.  Was unable to get his cogentin filled prior to this evening.  Tonight at home started to have a tightness and pain in his neck where it was turned to one side as well as some contractions in his hands.  Was very painful and improved slightly on its own and has now been intermittently getting better and worse since.  No fevers and no SOB.  Never had this happen before with psych meds.

## 2021-10-06 NOTE — ED ADULT NURSE NOTE - OBJECTIVE STATEMENT
received pt to rm 17, A&Ox4, amb. 36y/o male hx of schizoaffective disorder recently discharged from [facility] for psych admission. pt states upon DC he was given a Haldol injection that last 30 days. pt states tonight his neck began feeling stiff and was unable to move it. pt is requesting Cogentin. pt denies any other acute complaints. Meds given as ordered.

## 2021-11-30 ENCOUNTER — APPOINTMENT (OUTPATIENT)
Dept: INTERNAL MEDICINE | Facility: CLINIC | Age: 36
End: 2021-11-30

## 2022-08-03 NOTE — DISCHARGE NOTE BEHAVIORAL HEALTH - NSBHDCRESPONSEFT_PSY_A_CORE
Inpatient Rehabilitation Plan of Care Note    Plan of Care  Updated Problems/Interventions  Medical Problem(s)    BNE (Active)  Att'n. - Mildly Imp.  Exec. Fx. - Min-Mildly Imp.  Rsng/Jgmnt - WNL for common sense jgmnt, Mildly Imp. for abstract reasoning  Arith - Mildly Imp.  Visuospatial Skills - WNL  Visual Mem. - Mildly Imp.  Verbal Mem. - Mildly Imp., not aided by cues (Pt's wife reported mild premorbid  STM difficulties)  Emot - Pt reported improved due to pending discharge    Signed by: Osiel Ambriz Psy.d     good

## 2022-08-27 NOTE — PROGRESS NOTE BEHAVIORAL HEALTH - PROBLEM SELECTOR PLAN 1
Prep Survey    Flowsheet Row Responses   [facility] facility patient discharged from? [place]   Is LACE score < 7 ? No   Emergency Room discharge w/ pulse ox? No   Eligibility Not Eligible   What are the reasons patient is not eligible? Other  [substance abuse]   Does the patient have one of the following disease processes/diagnoses(primary or secondary)? Other  [Large bowel obstruction ]   Prep survey completed? Yes          KAJAL GRIMES - Registered Nurse         1. Rechecked TSH- WNL.   2.Hospitalist  follow up.  No further treatment needed.

## 2022-12-06 NOTE — ED PROVIDER NOTE - PROGRESS NOTE ADDITIONAL1
Brief Operative Note    Patient: Agusto Sheppard 54 year old male    MRN: [id number]    Surgeon(s): Rex Escudero DO  Phone Number: [phone]                       Surgeon(s) and Role:     * Rex Escudero DO - Primary    Assistant(s): Santiago Ayon DO     Pre-Op Diagnosis: right femur fx     Post-Op Diagnosis: same      Procedure: Procedure(s):  INSERTION, INTRAMEDULLARY PONCE, FEMUR    Anesthesia Type: No value filed.                                   Complications: None    Description: see formal    Findings: see formal    Specimens Removed: No specimens collected     Estimated Blood Loss: 100cc    Assistant Tasks: Opening and closing, Dissecting tissue and Implanting devices     Implants:   Implant Name Type Inv. Item Serial No.  Lot No. LRB No. Used Action   T2 ALPHA FEMUR RETROGRADE SYSTEM     [id number] Right 1 Implanted   LOCKING SCREW 5X85 MM     [id number] Right 1 Implanted   SCREW BN 5MM 60MM T2 ALPHA LOCK STRL - [id number] Screw SCREW BN 5MM 60MM T2 ALPHA LOCK STRL  [employer] [id number] Right 1 Implanted   SCREW BN 5MM 75MM T2 ALPHA LOCK STRL - [id number] Screw SCREW BN 5MM 75MM T2 ALPHA LOCK STRL  [employer] [id number] Right 1 Implanted   SCREW BN 5MM 75MM T2 ALPHA LOCK STRL - [id number] Screw SCREW BN 5MM 75MM T2 ALPHA LOCK STRL  [employer] [id number] Right 1 Implanted   SCREW BN 5MM 42.5MM T2 ALPHA LOCK STRL - [id number] Screw SCREW BN 5MM 42.5MM T2 ALPHA LOCK STRL  [employer] [id number] Right 1 Implanted   SCREW BN 5MM 40MM T2 ALPHA LOCK STRL - [id number] Screw SCREW BN 5MM 40MM T2 ALPHA LOCK STRL  [employer] [id number] Right 1 Implanted       POSTOP PLAN:    -Perioperative antibiotics x 24hrs  -Resume diet  -XR femur  -DVT Ppx - ok 8hrs postop  -PT/OT NWB RLE  -Dispo STIC       Additional Progress Note...

## 2023-04-16 NOTE — PROGRESS NOTE BEHAVIORAL HEALTH - NSBHCHARTREVIEWVS_PSY_A_CORE FT
Attending Attestation (For Attendings USE Only)... Vital Signs Last 24 Hrs  T(C): 35.6 (24 Jul 2017 09:07), Max: 36.9 (23 Jul 2017 20:23)  T(F): 96.1 (24 Jul 2017 09:07), Max: 98.4 (23 Jul 2017 20:23)  HR: 67 (24 Jul 2017 09:07) (67 - 85)  BP: 144/83 (24 Jul 2017 09:07) (135/86 - 144/83)  BP(mean): --  RR: 14 (24 Jul 2017 09:07) (14 - 14)  SpO2: 100% (24 Jul 2017 09:07) (100% - 100%)

## 2023-12-08 NOTE — ED ADULT NURSE NOTE - FALL HARM RISK TYPE OF ASSESSMENT
General Surgery Office Visit   Patient: Bjorn Esquivel Date: 2023   : 1991    32 year old male  MRN:[id number]        Referred By: Dr. Villalpando    REASON FOR CONSULTATION  Chief Complaint   Patient presents with    Office Visit     Pilonidal cyst       HISTORY OF PRESENT ILLNESS:   This patient is a 32 year old male with complaints of pilonidal cyst. He was seen by PCP for this complaint on 10/17/23. At that time, the area was draining pus and blood. No I&D was performed. Today he states he has had pilonidal cyst since he was a teenager. It usually resolves on its own after about a day. This last episode has been the longest he has had. He reports the symptoms began about a week ago. He has had bloody/yellow thick foul smelling fluid coming from the area the last couple days but the amount has been decreasing. His tenderness has improved as well. He denies fever or chills.       PAST MEDICAL HISTORY:   ALLERGIES:   Allergen Reactions    Omeprazole VOMITING       Past Medical History:   Diagnosis Date    IBS (irritable bowel syndrome)      Past Surgical History:   Procedure Laterality Date    [place] tooth extraction       family history includes Cancer in his paternal grandmother; Diabetes in his father; Gastrointestinal in his father; Heart disease in his father; Hypertension in his father; Peripheral Vascular Disease in his mother.  Social History     Socioeconomic History    Marital status: Single     Spouse name: Not on file    Number of children: Not on file    Years of education: Not on file    Highest education level: Not on file   Occupational History    Occupation: Barista     Comment: Time with company: 1 week   Tobacco Use    Smoking status: Never    Smokeless tobacco: Never   Vaping Use    Vaping Use: never used   Substance and Sexual Activity    Alcohol use: No     Comment: History of alcoholism in family    Drug use: No    Sexual activity: Yes     Partners: Male   Other Topics Concern     Not on file   Social History Narrative    Not on file     Social Determinants of Health     Financial Resource Strain: Not on file   Food Insecurity: Not on file   Transportation Needs: Not on file   Physical Activity: Not on file   Stress: Not on file   Social Connections: Not on file   Intimate Partner Violence: Not At Risk (6/1/2022)    Intimate Partner Violence     Social Determinants: Intimate Partner Violence Past Fear: No     Social Determinants: Intimate Partner Violence Current Fear: No         MEDICATIONS:   (Not in a hospital admission)    Current Outpatient Medications   Medication Sig    Cholecalciferol 1.25 mg (50,000 units) capsule Take 1 capsule by mouth 1 day a week.    diazePAM (VALIUM) 5 MG tablet Take 1 tablet by mouth 1 time for 1 dose. 0ne hour prior to MRI. Will need  to and from     No current facility-administered medications for this visit.     Facility-Administered Medications Ordered in Other Visits   Medication    iohexol (OMNIPAQUE 350 INJECT) contrast solution 90 mL    sodium chloride 0.9 % injector flush 100 mL       REVIEW OF SYSTEMS:   Review of systems negative except for what is mentioned above      PHYSICAL EXAM  VITAL SIGNS:   Blood pressure 134/87, pulse (!) 103, temperature 97.6 °F (36.4 °C).    GENERAL: Patient is alert, oriented, in no acute distress.  Affect is Appropriate .  HEENT: Normocephalic, atraumatic.    LUNGS: Normal respiratory effort.   ABDOMEN: Soft, nontender   Gluteal: pilonidal sinus along superior gluteal cleft. Small amount of blood tinged drainage. No purulent fluid expressible. No surrounding erythema or warmth. No area of fluctuance.  EXTREMITIES: No cyanosis, clubbing or edema.  NEUROLOGICAL:  Gait is normal.  SKIN: Good tone with no rashes or lesions.      ASSESSMENT AND PLAN:   Pilonidal Cyst  -No signs or symptoms of infectious or abscess requiring drainage.   - Recommended continuing to perform sitz baths until drainage completely stops.  -  Patient advised to keep hair in area short and wash area thoroughly after hair removal  - Information for pilonidal cyst surgical clinic was given.    -Thank you for allowing us to participate in the care of this patient.     Sarah Hirsbrunner, PA-C    Admission